# Patient Record
Sex: MALE | Race: WHITE | NOT HISPANIC OR LATINO | Employment: FULL TIME | URBAN - METROPOLITAN AREA
[De-identification: names, ages, dates, MRNs, and addresses within clinical notes are randomized per-mention and may not be internally consistent; named-entity substitution may affect disease eponyms.]

---

## 2017-11-29 ENCOUNTER — OFFICE VISIT - HEALTHEAST (OUTPATIENT)
Dept: FAMILY MEDICINE | Facility: CLINIC | Age: 37
End: 2017-11-29

## 2017-11-29 DIAGNOSIS — Z00.00 ROUTINE PHYSICAL EXAMINATION: ICD-10-CM

## 2017-11-29 DIAGNOSIS — E11.9 DIET-CONTROLLED DIABETES MELLITUS (H): ICD-10-CM

## 2017-11-29 LAB
CHOLEST SERPL-MCNC: 304 MG/DL
FASTING STATUS PATIENT QL REPORTED: YES
HBA1C MFR BLD: 13.1 % (ref 3.5–6)
HDLC SERPL-MCNC: 35 MG/DL
LDLC SERPL CALC-MCNC: ABNORMAL MG/DL
TRIGL SERPL-MCNC: 554 MG/DL

## 2017-11-29 ASSESSMENT — MIFFLIN-ST. JEOR: SCORE: 2133.26

## 2017-11-30 ENCOUNTER — COMMUNICATION - HEALTHEAST (OUTPATIENT)
Dept: INTERNAL MEDICINE | Facility: CLINIC | Age: 37
End: 2017-11-30

## 2017-11-30 DIAGNOSIS — E11.9 TYPE 2 DIABETES MELLITUS WITHOUT COMPLICATION, WITHOUT LONG-TERM CURRENT USE OF INSULIN (H): ICD-10-CM

## 2017-12-06 ENCOUNTER — OFFICE VISIT - HEALTHEAST (OUTPATIENT)
Dept: FAMILY MEDICINE | Facility: CLINIC | Age: 37
End: 2017-12-06

## 2017-12-06 DIAGNOSIS — E11.9 TYPE 2 DIABETES MELLITUS WITHOUT COMPLICATION, WITHOUT LONG-TERM CURRENT USE OF INSULIN (H): ICD-10-CM

## 2017-12-06 DIAGNOSIS — E78.5 HYPERLIPIDEMIA: ICD-10-CM

## 2017-12-06 LAB
CHOLEST SERPL-MCNC: 253 MG/DL
FASTING STATUS PATIENT QL REPORTED: YES
HDLC SERPL-MCNC: 36 MG/DL
LDLC SERPL CALC-MCNC: 167 MG/DL
TRIGL SERPL-MCNC: 249 MG/DL

## 2017-12-11 ENCOUNTER — COMMUNICATION - HEALTHEAST (OUTPATIENT)
Dept: INTERNAL MEDICINE | Facility: CLINIC | Age: 37
End: 2017-12-11

## 2018-01-30 ENCOUNTER — COMMUNICATION - HEALTHEAST (OUTPATIENT)
Dept: FAMILY MEDICINE | Facility: CLINIC | Age: 38
End: 2018-01-30

## 2018-01-30 DIAGNOSIS — E11.9 TYPE 2 DIABETES MELLITUS WITHOUT COMPLICATION, WITHOUT LONG-TERM CURRENT USE OF INSULIN (H): ICD-10-CM

## 2018-02-01 ENCOUNTER — COMMUNICATION - HEALTHEAST (OUTPATIENT)
Dept: FAMILY MEDICINE | Facility: CLINIC | Age: 38
End: 2018-02-01

## 2018-02-01 DIAGNOSIS — E11.9 TYPE 2 DIABETES MELLITUS WITHOUT COMPLICATION, WITHOUT LONG-TERM CURRENT USE OF INSULIN (H): ICD-10-CM

## 2018-02-26 ENCOUNTER — OFFICE VISIT - HEALTHEAST (OUTPATIENT)
Dept: FAMILY MEDICINE | Facility: CLINIC | Age: 38
End: 2018-02-26

## 2018-02-26 DIAGNOSIS — N47.1 PHIMOSIS: ICD-10-CM

## 2018-02-26 DIAGNOSIS — Z01.818 ENCOUNTER FOR PREOPERATIVE EXAMINATION FOR GENERAL SURGICAL PROCEDURE: ICD-10-CM

## 2018-02-26 DIAGNOSIS — E11.9 TYPE 2 DIABETES MELLITUS WITHOUT COMPLICATION, WITHOUT LONG-TERM CURRENT USE OF INSULIN (H): ICD-10-CM

## 2018-02-26 LAB
ANION GAP SERPL CALCULATED.3IONS-SCNC: 10 MMOL/L (ref 5–18)
BASOPHILS # BLD AUTO: 0.1 THOU/UL (ref 0–0.2)
BASOPHILS NFR BLD AUTO: 2 % (ref 0–2)
BUN SERPL-MCNC: 15 MG/DL (ref 8–22)
CALCIUM SERPL-MCNC: 10.2 MG/DL (ref 8.5–10.5)
CHLORIDE BLD-SCNC: 100 MMOL/L (ref 98–107)
CO2 SERPL-SCNC: 26 MMOL/L (ref 22–31)
CREAT SERPL-MCNC: 0.8 MG/DL (ref 0.7–1.3)
EOSINOPHIL # BLD AUTO: 0.1 THOU/UL (ref 0–0.4)
EOSINOPHIL NFR BLD AUTO: 1 % (ref 0–6)
ERYTHROCYTE [DISTWIDTH] IN BLOOD BY AUTOMATED COUNT: 12.1 % (ref 11–14.5)
GFR SERPL CREATININE-BSD FRML MDRD: >60 ML/MIN/1.73M2
GLUCOSE BLD-MCNC: 125 MG/DL (ref 70–125)
HBA1C MFR BLD: 8 % (ref 3.5–6)
HCT VFR BLD AUTO: 43.6 % (ref 40–54)
HGB BLD-MCNC: 15 G/DL (ref 14–18)
LYMPHOCYTES # BLD AUTO: 2.5 THOU/UL (ref 0.8–4.4)
LYMPHOCYTES NFR BLD AUTO: 35 % (ref 20–40)
MCH RBC QN AUTO: 28.4 PG (ref 27–34)
MCHC RBC AUTO-ENTMCNC: 34.4 G/DL (ref 32–36)
MCV RBC AUTO: 82 FL (ref 80–100)
MONOCYTES # BLD AUTO: 0.4 THOU/UL (ref 0–0.9)
MONOCYTES NFR BLD AUTO: 6 % (ref 2–10)
NEUTROPHILS # BLD AUTO: 4.1 THOU/UL (ref 2–7.7)
NEUTROPHILS NFR BLD AUTO: 57 % (ref 50–70)
PLATELET # BLD AUTO: 183 THOU/UL (ref 140–440)
PMV BLD AUTO: 8.6 FL (ref 7–10)
POTASSIUM BLD-SCNC: 4 MMOL/L (ref 3.5–5)
RBC # BLD AUTO: 5.29 MILL/UL (ref 4.4–6.2)
SODIUM SERPL-SCNC: 136 MMOL/L (ref 136–145)
WBC: 7.3 THOU/UL (ref 4–11)

## 2018-02-26 ASSESSMENT — MIFFLIN-ST. JEOR: SCORE: 2124.41

## 2018-02-27 ENCOUNTER — COMMUNICATION - HEALTHEAST (OUTPATIENT)
Dept: INTERNAL MEDICINE | Facility: CLINIC | Age: 38
End: 2018-02-27

## 2018-03-01 ENCOUNTER — OFFICE VISIT - HEALTHEAST (OUTPATIENT)
Dept: FAMILY MEDICINE | Facility: CLINIC | Age: 38
End: 2018-03-01

## 2018-03-01 DIAGNOSIS — M54.50 ACUTE BILATERAL LOW BACK PAIN WITHOUT SCIATICA: ICD-10-CM

## 2018-03-01 DIAGNOSIS — S39.012A LUMBAR STRAIN, INITIAL ENCOUNTER: ICD-10-CM

## 2018-03-01 ASSESSMENT — MIFFLIN-ST. JEOR: SCORE: 2138.02

## 2018-03-02 ENCOUNTER — OFFICE VISIT - HEALTHEAST (OUTPATIENT)
Dept: EDUCATION SERVICES | Facility: CLINIC | Age: 38
End: 2018-03-02

## 2018-03-02 DIAGNOSIS — E11.9 TYPE 2 DIABETES MELLITUS WITHOUT COMPLICATION, WITHOUT LONG-TERM CURRENT USE OF INSULIN (H): ICD-10-CM

## 2018-03-06 ENCOUNTER — COMMUNICATION - HEALTHEAST (OUTPATIENT)
Dept: FAMILY MEDICINE | Facility: CLINIC | Age: 38
End: 2018-03-06

## 2018-03-06 ENCOUNTER — COMMUNICATION - HEALTHEAST (OUTPATIENT)
Dept: SCHEDULING | Facility: CLINIC | Age: 38
End: 2018-03-06

## 2018-04-04 ENCOUNTER — RECORDS - HEALTHEAST (OUTPATIENT)
Dept: ADMINISTRATIVE | Facility: OTHER | Age: 38
End: 2018-04-04

## 2018-05-31 ENCOUNTER — COMMUNICATION - HEALTHEAST (OUTPATIENT)
Dept: FAMILY MEDICINE | Facility: CLINIC | Age: 38
End: 2018-05-31

## 2018-05-31 DIAGNOSIS — E11.9 TYPE 2 DIABETES MELLITUS WITHOUT COMPLICATION, WITHOUT LONG-TERM CURRENT USE OF INSULIN (H): ICD-10-CM

## 2019-02-24 ENCOUNTER — COMMUNICATION - HEALTHEAST (OUTPATIENT)
Dept: FAMILY MEDICINE | Facility: CLINIC | Age: 39
End: 2019-02-24

## 2019-02-24 DIAGNOSIS — E11.9 TYPE 2 DIABETES MELLITUS WITHOUT COMPLICATION, WITHOUT LONG-TERM CURRENT USE OF INSULIN (H): ICD-10-CM

## 2019-07-12 ENCOUNTER — OFFICE VISIT - HEALTHEAST (OUTPATIENT)
Dept: FAMILY MEDICINE | Facility: CLINIC | Age: 39
End: 2019-07-12

## 2019-07-12 DIAGNOSIS — L97.511 DIABETIC ULCER OF TOE OF RIGHT FOOT ASSOCIATED WITH TYPE 2 DIABETES MELLITUS, LIMITED TO BREAKDOWN OF SKIN (H): ICD-10-CM

## 2019-07-12 DIAGNOSIS — E11.621 DIABETIC ULCER OF TOE OF RIGHT FOOT ASSOCIATED WITH TYPE 2 DIABETES MELLITUS, LIMITED TO BREAKDOWN OF SKIN (H): ICD-10-CM

## 2019-07-15 ENCOUNTER — OFFICE VISIT - HEALTHEAST (OUTPATIENT)
Dept: FAMILY MEDICINE | Facility: CLINIC | Age: 39
End: 2019-07-15

## 2019-07-15 DIAGNOSIS — E11.41 DIABETIC MONONEUROPATHY ASSOCIATED WITH TYPE 2 DIABETES MELLITUS (H): ICD-10-CM

## 2019-07-15 DIAGNOSIS — L97.501 DIABETIC ULCER OF TOE ASSOCIATED WITH TYPE 2 DIABETES MELLITUS, LIMITED TO BREAKDOWN OF SKIN, UNSPECIFIED LATERALITY (H): ICD-10-CM

## 2019-07-15 DIAGNOSIS — E11.621 DIABETIC ULCER OF TOE ASSOCIATED WITH TYPE 2 DIABETES MELLITUS, LIMITED TO BREAKDOWN OF SKIN, UNSPECIFIED LATERALITY (H): ICD-10-CM

## 2019-07-15 DIAGNOSIS — E11.9 TYPE 2 DIABETES MELLITUS WITHOUT COMPLICATION, WITHOUT LONG-TERM CURRENT USE OF INSULIN (H): ICD-10-CM

## 2019-07-15 DIAGNOSIS — E78.5 HYPERLIPIDEMIA, UNSPECIFIED HYPERLIPIDEMIA TYPE: ICD-10-CM

## 2019-07-15 LAB
ALBUMIN SERPL-MCNC: 4.3 G/DL (ref 3.5–5)
ALP SERPL-CCNC: 98 U/L (ref 45–120)
ALT SERPL W P-5'-P-CCNC: 87 U/L (ref 0–45)
ANION GAP SERPL CALCULATED.3IONS-SCNC: 9 MMOL/L (ref 5–18)
AST SERPL W P-5'-P-CCNC: 50 U/L (ref 0–40)
BILIRUB SERPL-MCNC: 0.6 MG/DL (ref 0–1)
BUN SERPL-MCNC: 15 MG/DL (ref 8–22)
CALCIUM SERPL-MCNC: 10.1 MG/DL (ref 8.5–10.5)
CHLORIDE BLD-SCNC: 103 MMOL/L (ref 98–107)
CHOLEST SERPL-MCNC: 243 MG/DL
CO2 SERPL-SCNC: 26 MMOL/L (ref 22–31)
CREAT SERPL-MCNC: 1.05 MG/DL (ref 0.7–1.3)
CREAT UR-MCNC: 66.2 MG/DL
FASTING STATUS PATIENT QL REPORTED: NO
GFR SERPL CREATININE-BSD FRML MDRD: >60 ML/MIN/1.73M2
GLUCOSE BLD-MCNC: 169 MG/DL (ref 70–125)
HBA1C MFR BLD: 11.6 % (ref 3.5–6)
HDLC SERPL-MCNC: 40 MG/DL
LDLC SERPL CALC-MCNC: 129 MG/DL
MICROALBUMIN UR-MCNC: 0.94 MG/DL (ref 0–1.99)
MICROALBUMIN/CREAT UR: 14.2 MG/G
POTASSIUM BLD-SCNC: 4.3 MMOL/L (ref 3.5–5)
PROT SERPL-MCNC: 8 G/DL (ref 6–8)
SODIUM SERPL-SCNC: 138 MMOL/L (ref 136–145)
TRIGL SERPL-MCNC: 368 MG/DL

## 2019-07-17 ENCOUNTER — OFFICE VISIT - HEALTHEAST (OUTPATIENT)
Dept: FAMILY MEDICINE | Facility: CLINIC | Age: 39
End: 2019-07-17

## 2019-07-17 ENCOUNTER — COMMUNICATION - HEALTHEAST (OUTPATIENT)
Dept: SCHEDULING | Facility: CLINIC | Age: 39
End: 2019-07-17

## 2019-07-17 DIAGNOSIS — E66.9 DIABETES MELLITUS TYPE 2 IN OBESE: ICD-10-CM

## 2019-07-17 DIAGNOSIS — E11.69 DIABETES MELLITUS TYPE 2 IN OBESE: ICD-10-CM

## 2019-07-17 DIAGNOSIS — K08.89 PAIN, DENTAL: ICD-10-CM

## 2019-07-17 DIAGNOSIS — L97.421 DIABETIC ULCER OF LEFT MIDFOOT ASSOCIATED WITH DIABETES MELLITUS DUE TO UNDERLYING CONDITION, LIMITED TO BREAKDOWN OF SKIN (H): ICD-10-CM

## 2019-07-17 DIAGNOSIS — E08.621 DIABETIC ULCER OF LEFT MIDFOOT ASSOCIATED WITH DIABETES MELLITUS DUE TO UNDERLYING CONDITION, LIMITED TO BREAKDOWN OF SKIN (H): ICD-10-CM

## 2019-07-17 ASSESSMENT — MIFFLIN-ST. JEOR: SCORE: 2120.16

## 2019-07-18 ENCOUNTER — COMMUNICATION - HEALTHEAST (OUTPATIENT)
Dept: FAMILY MEDICINE | Facility: CLINIC | Age: 39
End: 2019-07-18

## 2019-07-22 ENCOUNTER — COMMUNICATION - HEALTHEAST (OUTPATIENT)
Dept: FAMILY MEDICINE | Facility: CLINIC | Age: 39
End: 2019-07-22

## 2019-07-24 ENCOUNTER — OFFICE VISIT - HEALTHEAST (OUTPATIENT)
Dept: FAMILY MEDICINE | Facility: CLINIC | Age: 39
End: 2019-07-24

## 2019-07-24 DIAGNOSIS — E11.9 TYPE 2 DIABETES MELLITUS WITHOUT COMPLICATION, WITHOUT LONG-TERM CURRENT USE OF INSULIN (H): ICD-10-CM

## 2019-08-22 ENCOUNTER — RECORDS - HEALTHEAST (OUTPATIENT)
Dept: ADMINISTRATIVE | Facility: OTHER | Age: 39
End: 2019-08-22

## 2019-08-28 ENCOUNTER — COMMUNICATION - HEALTHEAST (OUTPATIENT)
Dept: FAMILY MEDICINE | Facility: CLINIC | Age: 39
End: 2019-08-28

## 2019-08-28 ENCOUNTER — OFFICE VISIT - HEALTHEAST (OUTPATIENT)
Dept: FAMILY MEDICINE | Facility: CLINIC | Age: 39
End: 2019-08-28

## 2019-08-28 DIAGNOSIS — L97.501 DIABETIC ULCER OF TOE ASSOCIATED WITH TYPE 2 DIABETES MELLITUS, LIMITED TO BREAKDOWN OF SKIN, UNSPECIFIED LATERALITY (H): ICD-10-CM

## 2019-08-28 DIAGNOSIS — E11.621 DIABETIC ULCER OF TOE ASSOCIATED WITH TYPE 2 DIABETES MELLITUS, LIMITED TO BREAKDOWN OF SKIN, UNSPECIFIED LATERALITY (H): ICD-10-CM

## 2019-08-28 DIAGNOSIS — E11.9 TYPE 2 DIABETES MELLITUS WITHOUT COMPLICATION, WITHOUT LONG-TERM CURRENT USE OF INSULIN (H): ICD-10-CM

## 2019-08-28 LAB — HBA1C MFR BLD: 7.6 % (ref 3.5–6)

## 2020-01-03 ENCOUNTER — COMMUNICATION - HEALTHEAST (OUTPATIENT)
Dept: INTERNAL MEDICINE | Facility: CLINIC | Age: 40
End: 2020-01-03

## 2020-01-03 ENCOUNTER — RECORDS - HEALTHEAST (OUTPATIENT)
Dept: ADMINISTRATIVE | Facility: OTHER | Age: 40
End: 2020-01-03

## 2020-01-03 DIAGNOSIS — E11.9 TYPE 2 DIABETES MELLITUS WITHOUT COMPLICATION, WITHOUT LONG-TERM CURRENT USE OF INSULIN (H): ICD-10-CM

## 2020-01-10 ENCOUNTER — COMMUNICATION - HEALTHEAST (OUTPATIENT)
Dept: FAMILY MEDICINE | Facility: CLINIC | Age: 40
End: 2020-01-10

## 2020-01-10 ENCOUNTER — OFFICE VISIT - HEALTHEAST (OUTPATIENT)
Dept: FAMILY MEDICINE | Facility: CLINIC | Age: 40
End: 2020-01-10

## 2020-01-10 DIAGNOSIS — Z00.00 ROUTINE GENERAL MEDICAL EXAMINATION AT A HEALTH CARE FACILITY: ICD-10-CM

## 2020-01-10 DIAGNOSIS — E11.3299 TYPE 2 DIABETES MELLITUS WITH MILD NONPROLIFERATIVE RETINOPATHY WITHOUT MACULAR EDEMA, WITHOUT LONG-TERM CURRENT USE OF INSULIN, UNSPECIFIED LATERALITY (H): ICD-10-CM

## 2020-01-10 DIAGNOSIS — R05.9 COUGH: ICD-10-CM

## 2020-01-10 DIAGNOSIS — E78.5 HYPERLIPIDEMIA, UNSPECIFIED HYPERLIPIDEMIA TYPE: ICD-10-CM

## 2020-01-10 LAB
ALBUMIN SERPL-MCNC: 4.4 G/DL (ref 3.5–5)
ALP SERPL-CCNC: 83 U/L (ref 45–120)
ALT SERPL W P-5'-P-CCNC: 53 U/L (ref 0–45)
ANION GAP SERPL CALCULATED.3IONS-SCNC: 11 MMOL/L (ref 5–18)
AST SERPL W P-5'-P-CCNC: 25 U/L (ref 0–40)
BILIRUB SERPL-MCNC: 0.8 MG/DL (ref 0–1)
BUN SERPL-MCNC: 16 MG/DL (ref 8–22)
CALCIUM SERPL-MCNC: 10.4 MG/DL (ref 8.5–10.5)
CHLORIDE BLD-SCNC: 103 MMOL/L (ref 98–107)
CHOLEST SERPL-MCNC: 201 MG/DL
CO2 SERPL-SCNC: 25 MMOL/L (ref 22–31)
CREAT SERPL-MCNC: 0.93 MG/DL (ref 0.7–1.3)
CREAT UR-MCNC: 176.4 MG/DL
FASTING STATUS PATIENT QL REPORTED: YES
GFR SERPL CREATININE-BSD FRML MDRD: >60 ML/MIN/1.73M2
GLUCOSE BLD-MCNC: 124 MG/DL (ref 70–125)
HBA1C MFR BLD: 6.6 % (ref 3.5–6)
HDLC SERPL-MCNC: 35 MG/DL
LDLC SERPL CALC-MCNC: 125 MG/DL
MICROALBUMIN UR-MCNC: 0.84 MG/DL (ref 0–1.99)
MICROALBUMIN/CREAT UR: 4.8 MG/G
POTASSIUM BLD-SCNC: 4.5 MMOL/L (ref 3.5–5)
PROT SERPL-MCNC: 8.2 G/DL (ref 6–8)
SODIUM SERPL-SCNC: 139 MMOL/L (ref 136–145)
TRIGL SERPL-MCNC: 204 MG/DL

## 2020-01-10 ASSESSMENT — MIFFLIN-ST. JEOR: SCORE: 2064.88

## 2020-01-13 ENCOUNTER — COMMUNICATION - HEALTHEAST (OUTPATIENT)
Dept: FAMILY MEDICINE | Facility: CLINIC | Age: 40
End: 2020-01-13

## 2020-03-27 ENCOUNTER — COMMUNICATION - HEALTHEAST (OUTPATIENT)
Dept: FAMILY MEDICINE | Facility: CLINIC | Age: 40
End: 2020-03-27

## 2020-03-27 DIAGNOSIS — E11.3299 TYPE 2 DIABETES MELLITUS WITH MILD NONPROLIFERATIVE RETINOPATHY WITHOUT MACULAR EDEMA, WITHOUT LONG-TERM CURRENT USE OF INSULIN, UNSPECIFIED LATERALITY (H): ICD-10-CM

## 2020-04-15 ENCOUNTER — COMMUNICATION - HEALTHEAST (OUTPATIENT)
Dept: FAMILY MEDICINE | Facility: CLINIC | Age: 40
End: 2020-04-15

## 2020-04-16 ENCOUNTER — OFFICE VISIT - HEALTHEAST (OUTPATIENT)
Dept: FAMILY MEDICINE | Facility: CLINIC | Age: 40
End: 2020-04-16

## 2020-04-16 DIAGNOSIS — N50.819 TESTICULAR PAIN: ICD-10-CM

## 2020-04-17 ENCOUNTER — COMMUNICATION - HEALTHEAST (OUTPATIENT)
Dept: FAMILY MEDICINE | Facility: CLINIC | Age: 40
End: 2020-04-17

## 2020-04-17 ENCOUNTER — OFFICE VISIT - HEALTHEAST (OUTPATIENT)
Dept: FAMILY MEDICINE | Facility: CLINIC | Age: 40
End: 2020-04-17

## 2020-04-17 DIAGNOSIS — R35.0 URINARY FREQUENCY: ICD-10-CM

## 2020-04-17 DIAGNOSIS — N50.819 TESTICULAR PAIN: ICD-10-CM

## 2020-04-17 LAB
ALBUMIN UR-MCNC: NEGATIVE MG/DL
APPEARANCE UR: CLEAR
BACTERIA #/AREA URNS HPF: NORMAL HPF
BILIRUB UR QL STRIP: NEGATIVE
COLOR UR AUTO: YELLOW
GLUCOSE UR STRIP-MCNC: NEGATIVE MG/DL
HGB UR QL STRIP: NEGATIVE
KETONES UR STRIP-MCNC: NEGATIVE MG/DL
LEUKOCYTE ESTERASE UR QL STRIP: NEGATIVE
NITRATE UR QL: NEGATIVE
PH UR STRIP: 5.5 [PH] (ref 5–8)
PSA SERPL-MCNC: 0.6 NG/ML (ref 0–2.5)
RBC #/AREA URNS AUTO: NORMAL HPF
SP GR UR STRIP: >=1.03 (ref 1–1.03)
SQUAMOUS #/AREA URNS AUTO: NORMAL LPF
UROBILINOGEN UR STRIP-ACNC: NORMAL
WBC #/AREA URNS AUTO: NORMAL HPF

## 2020-04-17 ASSESSMENT — MIFFLIN-ST. JEOR: SCORE: 2073.95

## 2020-04-29 ENCOUNTER — COMMUNICATION - HEALTHEAST (OUTPATIENT)
Dept: FAMILY MEDICINE | Facility: CLINIC | Age: 40
End: 2020-04-29

## 2020-05-04 ENCOUNTER — OFFICE VISIT - HEALTHEAST (OUTPATIENT)
Dept: FAMILY MEDICINE | Facility: CLINIC | Age: 40
End: 2020-05-04

## 2020-05-04 DIAGNOSIS — E11.3299 TYPE 2 DIABETES MELLITUS WITH MILD NONPROLIFERATIVE RETINOPATHY WITHOUT MACULAR EDEMA, WITHOUT LONG-TERM CURRENT USE OF INSULIN, UNSPECIFIED LATERALITY (H): ICD-10-CM

## 2020-05-04 DIAGNOSIS — R10.30 LOWER ABDOMINAL PAIN: ICD-10-CM

## 2020-05-04 ASSESSMENT — MIFFLIN-ST. JEOR: SCORE: 2077.58

## 2020-07-03 ENCOUNTER — COMMUNICATION - HEALTHEAST (OUTPATIENT)
Dept: FAMILY MEDICINE | Facility: CLINIC | Age: 40
End: 2020-07-03

## 2020-07-03 DIAGNOSIS — E11.3299 TYPE 2 DIABETES MELLITUS WITH MILD NONPROLIFERATIVE RETINOPATHY WITHOUT MACULAR EDEMA, WITHOUT LONG-TERM CURRENT USE OF INSULIN, UNSPECIFIED LATERALITY (H): ICD-10-CM

## 2020-07-09 ENCOUNTER — AMBULATORY - HEALTHEAST (OUTPATIENT)
Dept: LAB | Facility: CLINIC | Age: 40
End: 2020-07-09

## 2020-07-09 ENCOUNTER — COMMUNICATION - HEALTHEAST (OUTPATIENT)
Dept: FAMILY MEDICINE | Facility: CLINIC | Age: 40
End: 2020-07-09

## 2020-07-09 DIAGNOSIS — E11.3299 TYPE 2 DIABETES MELLITUS WITH MILD NONPROLIFERATIVE RETINOPATHY WITHOUT MACULAR EDEMA, WITHOUT LONG-TERM CURRENT USE OF INSULIN, UNSPECIFIED LATERALITY (H): ICD-10-CM

## 2020-07-09 LAB — HBA1C MFR BLD: 5.8 % (ref 3.5–6)

## 2020-07-10 ENCOUNTER — COMMUNICATION - HEALTHEAST (OUTPATIENT)
Dept: FAMILY MEDICINE | Facility: CLINIC | Age: 40
End: 2020-07-10

## 2020-08-13 ENCOUNTER — COMMUNICATION - HEALTHEAST (OUTPATIENT)
Dept: FAMILY MEDICINE | Facility: CLINIC | Age: 40
End: 2020-08-13

## 2020-08-13 DIAGNOSIS — E11.3299 TYPE 2 DIABETES MELLITUS WITH MILD NONPROLIFERATIVE RETINOPATHY WITHOUT MACULAR EDEMA, WITHOUT LONG-TERM CURRENT USE OF INSULIN, UNSPECIFIED LATERALITY (H): ICD-10-CM

## 2020-08-27 ENCOUNTER — COMMUNICATION - HEALTHEAST (OUTPATIENT)
Dept: FAMILY MEDICINE | Facility: CLINIC | Age: 40
End: 2020-08-27

## 2020-08-27 DIAGNOSIS — E11.3299 TYPE 2 DIABETES MELLITUS WITH MILD NONPROLIFERATIVE RETINOPATHY WITHOUT MACULAR EDEMA, WITHOUT LONG-TERM CURRENT USE OF INSULIN, UNSPECIFIED LATERALITY (H): ICD-10-CM

## 2020-09-02 ENCOUNTER — OFFICE VISIT - HEALTHEAST (OUTPATIENT)
Dept: FAMILY MEDICINE | Facility: CLINIC | Age: 40
End: 2020-09-02

## 2020-09-02 DIAGNOSIS — F41.9 ANXIETY: ICD-10-CM

## 2020-09-02 DIAGNOSIS — E11.3299 TYPE 2 DIABETES MELLITUS WITH MILD NONPROLIFERATIVE RETINOPATHY WITHOUT MACULAR EDEMA, WITHOUT LONG-TERM CURRENT USE OF INSULIN, UNSPECIFIED LATERALITY (H): ICD-10-CM

## 2020-09-02 DIAGNOSIS — R10.10 UPPER ABDOMINAL PAIN: ICD-10-CM

## 2020-09-02 LAB
ALBUMIN SERPL-MCNC: 4.7 G/DL (ref 3.5–5)
ALP SERPL-CCNC: 69 U/L (ref 45–120)
ALT SERPL W P-5'-P-CCNC: 46 U/L (ref 0–45)
ANION GAP SERPL CALCULATED.3IONS-SCNC: 12 MMOL/L (ref 5–18)
AST SERPL W P-5'-P-CCNC: 27 U/L (ref 0–40)
BILIRUB SERPL-MCNC: 1.2 MG/DL (ref 0–1)
BUN SERPL-MCNC: 13 MG/DL (ref 8–22)
CALCIUM SERPL-MCNC: 10.2 MG/DL (ref 8.5–10.5)
CHLORIDE BLD-SCNC: 99 MMOL/L (ref 98–107)
CO2 SERPL-SCNC: 27 MMOL/L (ref 22–31)
CREAT SERPL-MCNC: 1 MG/DL (ref 0.7–1.3)
GFR SERPL CREATININE-BSD FRML MDRD: >60 ML/MIN/1.73M2
GLUCOSE BLD-MCNC: 104 MG/DL (ref 70–125)
LIPASE SERPL-CCNC: 21 U/L (ref 0–52)
POTASSIUM BLD-SCNC: 3.9 MMOL/L (ref 3.5–5)
PROT SERPL-MCNC: 8.7 G/DL (ref 6–8)
SODIUM SERPL-SCNC: 138 MMOL/L (ref 136–145)

## 2020-09-03 ENCOUNTER — COMMUNICATION - HEALTHEAST (OUTPATIENT)
Dept: FAMILY MEDICINE | Facility: CLINIC | Age: 40
End: 2020-09-03

## 2020-09-08 ENCOUNTER — RECORDS - HEALTHEAST (OUTPATIENT)
Dept: ADMINISTRATIVE | Facility: OTHER | Age: 40
End: 2020-09-08

## 2020-09-08 LAB — RETINOPATHY: POSITIVE

## 2020-09-14 ENCOUNTER — RECORDS - HEALTHEAST (OUTPATIENT)
Dept: HEALTH INFORMATION MANAGEMENT | Facility: CLINIC | Age: 40
End: 2020-09-14

## 2020-10-11 ENCOUNTER — COMMUNICATION - HEALTHEAST (OUTPATIENT)
Dept: FAMILY MEDICINE | Facility: CLINIC | Age: 40
End: 2020-10-11

## 2020-10-11 DIAGNOSIS — E11.3299 TYPE 2 DIABETES MELLITUS WITH MILD NONPROLIFERATIVE RETINOPATHY WITHOUT MACULAR EDEMA, WITHOUT LONG-TERM CURRENT USE OF INSULIN, UNSPECIFIED LATERALITY (H): ICD-10-CM

## 2020-11-09 ENCOUNTER — COMMUNICATION - HEALTHEAST (OUTPATIENT)
Dept: FAMILY MEDICINE | Facility: CLINIC | Age: 40
End: 2020-11-09

## 2021-01-15 ENCOUNTER — COMMUNICATION - HEALTHEAST (OUTPATIENT)
Dept: FAMILY MEDICINE | Facility: CLINIC | Age: 41
End: 2021-01-15

## 2021-02-02 ENCOUNTER — COMMUNICATION - HEALTHEAST (OUTPATIENT)
Dept: FAMILY MEDICINE | Facility: CLINIC | Age: 41
End: 2021-02-02

## 2021-02-02 DIAGNOSIS — E11.3299 TYPE 2 DIABETES MELLITUS WITH MILD NONPROLIFERATIVE RETINOPATHY WITHOUT MACULAR EDEMA, WITHOUT LONG-TERM CURRENT USE OF INSULIN, UNSPECIFIED LATERALITY (H): ICD-10-CM

## 2021-02-17 ENCOUNTER — OFFICE VISIT - HEALTHEAST (OUTPATIENT)
Dept: FAMILY MEDICINE | Facility: CLINIC | Age: 41
End: 2021-02-17

## 2021-02-17 DIAGNOSIS — E11.41 DIABETIC MONONEUROPATHY ASSOCIATED WITH TYPE 2 DIABETES MELLITUS (H): ICD-10-CM

## 2021-02-17 DIAGNOSIS — E11.3299 TYPE 2 DIABETES MELLITUS WITH MILD NONPROLIFERATIVE RETINOPATHY WITHOUT MACULAR EDEMA, WITHOUT LONG-TERM CURRENT USE OF INSULIN, UNSPECIFIED LATERALITY (H): ICD-10-CM

## 2021-02-17 LAB
CHOLEST SERPL-MCNC: 225 MG/DL
CREAT UR-MCNC: 72.9 MG/DL
FASTING STATUS PATIENT QL REPORTED: NO
HBA1C MFR BLD: 6.4 %
HDLC SERPL-MCNC: 42 MG/DL
LDLC SERPL CALC-MCNC: 126 MG/DL
MICROALBUMIN UR-MCNC: <0.5 MG/DL (ref 0–1.99)
MICROALBUMIN/CREAT UR: NORMAL MG/G{CREAT}
TRIGL SERPL-MCNC: 284 MG/DL

## 2021-02-22 ENCOUNTER — COMMUNICATION - HEALTHEAST (OUTPATIENT)
Dept: FAMILY MEDICINE | Facility: CLINIC | Age: 41
End: 2021-02-22

## 2021-04-05 ENCOUNTER — AMBULATORY - HEALTHEAST (OUTPATIENT)
Dept: NURSING | Facility: CLINIC | Age: 41
End: 2021-04-05

## 2021-04-26 ENCOUNTER — AMBULATORY - HEALTHEAST (OUTPATIENT)
Dept: NURSING | Facility: CLINIC | Age: 41
End: 2021-04-26

## 2021-05-30 NOTE — PROGRESS NOTES
Assessment/Plan:        1. Diabetic mononeuropathy associated with type 2 diabetes mellitus (H)    - Ambulatory referral to Diabetes Education Program(New Diagnosis)      - Glycosylated Hemoglobin A1c  - Microalbumin, Random Urine    - Comprehensive Metabolic Panel  Mildly elevated liver enzymes and elevated blood sugar  Management is aimed at better diabetic control cutting back on carbs and fats  Continue with metformin XR 1000 mg daily  Goal to keep BG level below 150    Follow up in 2 weeks.     - blood glucose test strips; Use 1 each As Directed daily as needed. Dispense brand per patient's insurance at pharmacy discretion.  Test two times a day  Dispense: 200 strip; Refill: 3    3. Diabetic ulcer of toe associated with type 2 diabetes mellitus, limited to breakdown of skin, unspecified laterality (H)  Continue current management with the antibiotic and wound management  Follow-up in 2 weeks    4. Hyperlipidemia, unspecified hyperlipidemia type    - Lipid Profile  Elevated triglycerides and cholesterol  Make an attempt to improve diet, cut back on the carbs and fats,  and exercise more efficiently to aid in medical management of this problem.    Recheck in 3 months       At the conclusion of the encounter the plan of care, disposition and all questions were answered and reviewed, and the patient acknowledged understanding and was involved in the decision making regarding the overall care plan.     45 minutes or greater were spent with the patient today with greater than 50% of the times spent in counseling and management of care.        Subjective:    Patient ID:   Adam Arce is a 39 y.o. male comes in follow-up of diabetes management  He says to have been neglected this issue for some time causing his health to deteriorate with noting to be getting sores/ulcers on the bottom of his feet near the toes and was recently seen for this and put on antibiotics; and now wants to get back on to getting his health  back in shape    He is on metformin XR 1000 mg daily  Average BG: Has previously been in the 300 's.  But recently coming down to higher 100's.       Lab Results   Component Value Date    HGBA1C 8.0 (H) 02/26/2018      Lab Results   Component Value Date    LDLCALC 167 (H) 12/06/2017      Lab Results   Component Value Date    MICROALBUR 0.78 12/06/2017     He has no other additional concerns         Review of Systems  Allergy: reviewed  General : negative  A complete 7 point review of systems was obtained and is negative other than what is stated in the HPI.         The following patient's history were reviewed and updated as appropriate:   He  has a past medical history of Diet-controlled diabetes mellitus (H) and Overweight..      Outpatient Encounter Medications as of 7/15/2019   Medication Sig Dispense Refill     acetaminophen (TYLENOL) 500 MG tablet Take 500 mg by mouth every 6 (six) hours as needed for pain.       blood glucose meter (GLUCOMETER) Use 1 each As Directed as needed. Dispense glucometer brand per patient's insurance at pharmacy discretion. 1 each 0     blood glucose test strips Use 1 each As Directed daily as needed. Dispense brand per patient's insurance at pharmacy discretion. 100 strip 3     generic lancets (FINGERSTIX LANCETS) Use 1 each As Directed daily as needed. Dispense brand per patient's insurance at pharmacy discretion. 100 each 3     ibuprofen (ADVIL,MOTRIN) 200 MG tablet Take 200 mg by mouth every 6 (six) hours as needed for pain.       metFORMIN (GLUCOPHAGE-XR) 500 MG 24 hr tablet TAKE 2 TABLETS BY MOUTH DAILY WITH BREAKFAST. 60 tablet 0     sulfamethoxazole-trimethoprim (BACTRIM DS) 800-160 mg per tablet Take 1 tablet by mouth 2 (two) times a day for 10 days. 20 tablet 0     [DISCONTINUED] cyclobenzaprine (FLEXERIL) 5 MG tablet Take 1 tablet (5 mg total) by mouth 3 (three) times a day as needed for muscle spasms. 30 tablet 0     No facility-administered encounter medications on file  as of 7/15/2019.          Objective:   /80 (Patient Site: Right Arm, Patient Position: Sitting, Cuff Size: Adult Large)   Pulse 79   Wt (!) 253 lb 14.4 oz (115.2 kg)   SpO2 99%   BMI 33.04 kg/m        Physical Exam  General Appearance:    Alert, well hydrated, no distress,    Eyes:    PERRL, conjunctiva/corneas clear,    Throat:   Lips, mucosa, and tongue normal; teeth and gums normal   Neck:   Supple, symmetrical, trachea midline, no adenopathy;        thyroid:  No enlargement/tenderness/nodules; no carotid    bruit or JVD   Lungs:     Clear to auscultation bilaterally, respirations unlabored   Heart:    Regular rate and rhythm, S1 and S2 normal, no murmur, rub   or gallop   Abdomen:     Soft, non-tender, normal bowel sounds, no rebound or guarding, no masses, no organomegaly   Extremities:   Extremities normal, atraumatic, no cyanosis or edema   Skin:  There is a grade 1 plantar ulcerative lesion  proximal 2nd toe  Skin color, texture, turgor normal, no other rashes or lesions

## 2021-05-30 NOTE — PROGRESS NOTES
Assessment/Plan:         Adam was seen today for wound check.    Diagnoses and all orders for this visit:    Diabetic ulcer of toe of right foot associated with type 2 diabetes mellitus, limited to breakdown of skin (H): Type 1 diabetic ulcer bilaterally. The left side has early signs of becoming infected and so I did start bactrim two times a day [id number], we dressed the wound, discussed ways to protect his toes from pressure sores. He has a follow-up scheduled already on Monday and will have these rechecked. Currently does not appear he needs debridement. Discussed concerning symptoms for which to return over the weekend.   -     sulfamethoxazole-trimethoprim (BACTRIM DS) 800-160 mg per tablet; Take 1 tablet by mouth 2 (two) times a day for 10 days.                Plan of care was discussed with the patient and/or guardian. They verbalize understanding of the treatment options and plan of care.    Milena Mckeon       Subjective:        Adam Arce is a 39 y.o. male who presents for sores on his feet.   Bilaterally, he has little white sores on his feet. [facility] tells him these are ulcers from diabetes so she came in.   Have been present for 4-5 days. Keeping them clean and covered. Not getting worse but not getting better either. Maybe a little more swelling now.     He is diabetic, knows his blood sugars are very high. Taking 1000mg a day of metformin (XR) but has checked his sugars and they are quite high. He has an appointment with his PCP on Monday to get this checked.     He has no fever, chills, nausea, malaise. His toes do not hurt.          Objective:       Blood pressure (!) 148/95, pulse 80, temperature 97.9  F (36.6  C), temperature source Oral, resp. rate 16, weight (!) 257 lb 2 oz (116.6 kg), SpO2 98 %.   Gen: well appearing, no distress.  Skin: Right 2nd toe with a shallow ulcer at the base of the toe, it does not go completely through the skin, it is clean and the base of the ulcer is pink with  only a thin layer of yellow sloughing skin.   Left toe is nearly identical, but with erythema extended to the lateral side of the toe and increase swelling.   No pain on palpation.

## 2021-05-30 NOTE — PROGRESS NOTES
Assessment/Plan:        1. Type 2 diabetes mellitus without complication, without long-term current use of insulin (H)  Follow up on diabetes  Not optimized     Plan:   Start:   - dulaglutide (TRULICITY) 0.75 mg/0.5 mL PnIj; Inject 0.75 mg under the skin every 7 days.  Dispense: 4 Syringe; Refill: 0    Continue:     - metFORMIN (GLUCOPHAGE-XR) 500 MG 24 hr tablet; Take 2 tablets (1,000 mg total) by mouth daily with breakfast.  Dispense: 60 tablet; Refill: 0       Follow up in a month        At the conclusion of the encounter the plan of care, disposition and all questions were answered and reviewed, and the patient acknowledged understanding and was involved in the decision making regarding the overall care plan.           Subjective:    Patient ID:   Adam Arce is a 39 y.o. male comes in for follow up.    1. Type 2 diabetes mellitus   Managed on metformin XR 1000 mg daily  Doing better on his BG, stating to just barely making to the 150 goal marker.        No other side effects or concerns         Review of Systems  Allergy: reviewed  General : negative  A complete 5 point review of systems was obtained and is negative other than what is stated in the HPI.       The following patient's history were reviewed and updated as appropriate:   He  has a past medical history of Diet-controlled diabetes mellitus (H) and Overweight..      Outpatient Encounter Medications as of 7/24/2019   Medication Sig Dispense Refill     acetaminophen (TYLENOL) 500 MG tablet Take 500 mg by mouth every 6 (six) hours as needed for pain.       blood glucose meter (GLUCOMETER) Use 1 each As Directed as needed. Dispense glucometer brand per patient's insurance at pharmacy discretion. 1 each 0     blood glucose test strips Use 1 each As Directed daily as needed. Dispense brand per patient's insurance at pharmacy discretion.  Test two times a day 200 strip 3     generic lancets (FINGERSTIX LANCETS) Use 1 each As Directed daily as needed.  Dispense brand per patient's insurance at pharmacy discretion. 100 each 3     ibuprofen (ADVIL,MOTRIN) 200 MG tablet Take 200 mg by mouth every 6 (six) hours as needed for pain.       metFORMIN (GLUCOPHAGE-XR) 500 MG 24 hr tablet TAKE 2 TABLETS BY MOUTH DAILY WITH BREAKFAST. 60 tablet 0     No facility-administered encounter medications on file as of 7/24/2019.          Objective:   /64 (Patient Site: Right Arm, Patient Position: Sitting, Cuff Size: Adult Large)   Pulse (!) 59   Wt (!) 249 lb 11.2 oz (113.3 kg)   SpO2 98%   BMI 32.50 kg/m        Physical Exam  General Appearance:    Alert, well hydrated, no distress   Throat:   mucous membranes moist, pharynx normal without lesions   Neck:   Supple, symmetrical, trachea midline, no adenopathy;     thyroid:  no enlargement/tenderness/nodules;    Lungs:     clear to auscultation, no wheezes, rales or rhonchi, symmetric air entry     Heart:    Regular rate and rhythm, S1 and S2 normal, no murmur, rub   or gallop, no edema    Skin:   Skin color, texture, turgor normal, no rashes or lesions

## 2021-05-30 NOTE — PROGRESS NOTES
Assessment and plan    1. Diabetes mellitus type 2 in obese (H) glycemic control is gradually improving blood sugars now in the 150s to 180s in the morning previously was in the 300 range.  His A1c is also fluctuating from 8 to above 11 his last visit with his primary doctor in July.  He is scheduled to see him again in the end of the month.  Discussed various options including supplementing the metformin with the use of Januvia he will think about that for now on we discussed his carbohydrate content in his diet.    2. Pain, dental patient has intermittent dental pain noted in the left upper jaw after receiving his crown.  No evidence of gingiva involvement.  He is seeing his dentist tomorrow.  He may require a root canal.    3. Diabetic ulcer of left midfoot associated with diabetes mellitus due to underlying condition, limited to breakdown of skin (H) he also has are healing he is on Bactrim no side effects noted, no additional breakdown of skin.        There are no Patient Instructions on file for this visit.    No orders of the defined types were placed in this encounter.    There are no discontinued medications.    No follow-ups on file.    CHIEF COMPLAINT:  Chief Complaint   Patient presents with     Dental issues     possible infection     Dizziness       HISTORY OF PRESENT ILLNESS:  Adam is a 39 y.o. male who is here today because she is been feeling feverish and ill.  He is noticed that his blood sugars have been fluctuating and the pain started throughout the weekend after he had a crown placed on Friday.  He did call his dentist who thinks the area may have been infected.  He is scheduled to see his dentist tomorrow.  He is been struggling with his blood sugars over the last few months.  He also was seen in the walk-in clinic for follow-up of diabetic ulcer on his left foot which are healing.  He reports that he is been taking the Bactrim regularly his blood sugars have been decreasing.    He is been  "using ibuprofen and Tylenol alternately to control the dental pain he admits that it is difficult to eat on the left side of his mouth.    REVIEW OF SYSTEMS:     General positive for fever chills this morning  HEENT positive for left-sided oral pain  10 point review of  All other systems are negative.    PFSH:    He is     TOBACCO USE:  Social History     Tobacco Use   Smoking Status Never Smoker   Smokeless Tobacco Never Used       VITALS:  Vitals:    07/17/19 1344   BP: 136/84   Pulse: 79   Resp: 20   Temp: 98  F (36.7  C)   TempSrc: Oral   SpO2: 98%   Weight: (!) 252 lb 1 oz (114.3 kg)   Height: 6' 1.5\" (1.867 m)     Wt Readings from Last 3 Encounters:   07/17/19 (!) 252 lb 1 oz (114.3 kg)   07/15/19 (!) 253 lb 14.4 oz (115.2 kg)   07/12/19 (!) 257 lb 2 oz (116.6 kg)       PHYSICAL EXAM:  Interactive  male sitting comes in exam no acute distress  HEENT neck supple mucous members moist no lymph enlargement noted in the neck.  Oral cavity shows no exudate no erythema no tenderness over the gingiva.  Count appears to be in place.  Respiratory system clear to auscultation equal breath sounds no wheeze no crackles  [facility] regular rate and rhythm no murmurs rubs or gallops  Skin 2 shallow well-demarcated grade 1 ulcers noted on the second and fourth toes of his left foot on the volar aspect only measuring 0.5 x 0.3 cm teeth are nontender no drainage noted    DATA REVIEWED:  Additional History from Old Records Summarized (2): 2  Reviewed clinic notes from walk-in clinic and PCP clinic note dated 7/15/2019  Decision to Obtain Records (1): 0  Radiology Tests Summarized or Ordered (1): 0  Labs Reviewed or Ordered (1): 1 last A1c above 11  Medicine Test Summarized or Ordered (1): 0  Independent Review of EKG or X-RAY(2 each): 0    The visit lasted a total of 20 minutes face to face with the patient. Over 50% of the time was spent counseling and educating the patient about   Diabetes foot ulcers and dental " pain.    MEDICATIONS:  Current Outpatient Medications   Medication Sig Dispense Refill     acetaminophen (TYLENOL) 500 MG tablet Take 500 mg by mouth every 6 (six) hours as needed for pain.       blood glucose meter (GLUCOMETER) Use 1 each As Directed as needed. Dispense glucometer brand per patient's insurance at pharmacy discretion. 1 each 0     blood glucose test strips Use 1 each As Directed daily as needed. Dispense brand per patient's insurance at pharmacy discretion.  Test two times a day 200 strip 3     generic lancets (FINGERSTIX LANCETS) Use 1 each As Directed daily as needed. Dispense brand per patient's insurance at pharmacy discretion. 100 each 3     ibuprofen (ADVIL,MOTRIN) 200 MG tablet Take 200 mg by mouth every 6 (six) hours as needed for pain.       metFORMIN (GLUCOPHAGE-XR) 500 MG 24 hr tablet TAKE 2 TABLETS BY MOUTH DAILY WITH BREAKFAST. 60 tablet 0     sulfamethoxazole-trimethoprim (BACTRIM DS) 800-160 mg per tablet Take 1 tablet by mouth 2 (two) times a day for 10 days. 20 tablet 0     No current facility-administered medications for this visit.      Data points 3      Please note that this clinical encounter uses voice recognition software, there may be typographical errors present

## 2021-05-30 NOTE — TELEPHONE ENCOUNTER
Triage call:     Patient is calling with concerns over dizziness that he experienced this morning after taking his medication. Patient has been in recently for Wounds on feet- on Bactrim DS. Also had a crown fitted last week- tooth ache pain- taking tylenol and Advil alternating every 6 hours. Has an appointment with Dentist tomorrow.     Started to get dizzy and didn't feel well this morning after taking his medication- tylenol and metformin. Tooth ache is barely coming in. Today he notes that he hasn't been drinking as much as normal. Also has been having issues with his blood sugar lately 's fasting- taking metformin- target range 150. Pulse 71 bpm. Feels ok now. Has been eating low carb and focusing on protein.     Patient would like to be seen to make sure that there is no infection that he needs to be concerned about.     Triaged to be seen today- reviewed additional care advice with patient and he verbalizes understanding. Appointment @ 1:45 pm with Dr Almendarez today.     Nalini Vazquez RN [facility] Triage/Med Refill 7/17/2019 11:12 AM    Reason for Disposition    Patient wants to be seen    Protocols used: DIZZINESS-A-OH

## 2021-05-30 NOTE — TELEPHONE ENCOUNTER
Please call to advise patient:  Extended antibiotic treatment beyond what has been initially recommended may not be warranted.  If there is any question that the infection may persist, would advise to follow-up in the office to recheck and evaluate.

## 2021-05-31 VITALS — BODY MASS INDEX: 32.49 KG/M2 | WEIGHT: 253.2 LBS | HEIGHT: 74 IN

## 2021-05-31 VITALS — BODY MASS INDEX: 32.46 KG/M2 | WEIGHT: 252.8 LBS

## 2021-05-31 NOTE — PROGRESS NOTES
Assessment/Plan:        1. Type 2 diabetes mellitus without complication, without long-term current use of insulin (H)  Recheck labs today  - Glycosylated Hemoglobin A1c  Continue on  - dulaglutide (TRULICITY) 0.75 mg/0.5 mL PnIj; Inject 0.75 mg under the skin every 7 days.  Dispense: 12 Syringe; Refill: 0    Add/increasing dose of metformin  - metFORMIN (GLUCOPHAGE-XR) 500 MG 24 hr tablet; Take 2 tablets (1,000 mg total) by mouth 2 (two) times a day.  Dispense: 360 tablet; Refill: 0    2. Diabetic ulcer of toe associated with type 2 diabetes mellitus, limited to breakdown of skin, unspecified laterality (H)  Continue current management    Follow-up in 3 months    Subjective:    Patient ID:   Adam Arce is a 39 y.o. male comes in for follow up        Type 2 diabetes mellitus and   Diabetic ulcer of toe    The blood sugars are tested to be consistently Below 140     Tolerating Trulicity and metformin    Needing a refill on both of these meds      As for the foot ulcers, being healing well without any additional concerns    Review of Systems  Allergy: reviewed  General : negative  A complete 5 point review of systems was obtained and is negative other than what is stated in the HPI.       The following patient's history were reviewed and updated as appropriate:   He  has a past medical history of Diet-controlled diabetes mellitus (H) and Overweight..      Outpatient Encounter Medications as of 8/28/2019   Medication Sig Dispense Refill     acetaminophen (TYLENOL) 500 MG tablet Take 500 mg by mouth every 6 (six) hours as needed for pain.       blood glucose meter (GLUCOMETER) Use 1 each As Directed as needed. Dispense glucometer brand per patient's insurance at pharmacy discretion. 1 each 0     blood glucose test strips Use 1 each As Directed daily as needed. Dispense brand per patient's insurance at pharmacy discretion.  Test two times a day 200 strip 3     dulaglutide (TRULICITY) 0.75 mg/0.5 mL PnIj Inject 0.75  mg under the skin every 7 days. 4 Syringe 0     generic lancets (FINGERSTIX LANCETS) Use 1 each As Directed daily as needed. Dispense brand per patient's insurance at pharmacy discretion. 100 each 3     ibuprofen (ADVIL,MOTRIN) 200 MG tablet Take 200 mg by mouth every 6 (six) hours as needed for pain.       metFORMIN (GLUCOPHAGE-XR) 500 MG 24 hr tablet Take 2 tablets (1,000 mg total) by mouth daily with breakfast. 60 tablet 0     terbinafine HCl (LAMISIL) 250 mg tablet Take 250 mg by mouth daily.  0     No facility-administered encounter medications on file as of 8/28/2019.          Objective:   /70 (Patient Site: Right Arm, Patient Position: Sitting, Cuff Size: Adult Large)   Pulse 63   Wt (!) 245 lb 3.2 oz (111.2 kg)   SpO2 97%   BMI 31.91 kg/m        Physical Exam  General Appearance:    Alert, cooperative, no distress,    Lungs:     Clear to auscultation bilaterally, respirations unlabored   Heart:    Regular rate and rhythm, S1 and S2 normal, no murmur, rub   or gallop   Extremities/Foot :   Extremities normal, atraumatic, no cyanosis or edema,     healing foot ulcers

## 2021-06-01 VITALS — HEIGHT: 74 IN | BODY MASS INDEX: 32.85 KG/M2 | WEIGHT: 256 LBS

## 2021-06-01 VITALS — WEIGHT: 253 LBS | HEIGHT: 74 IN | BODY MASS INDEX: 32.47 KG/M2

## 2021-06-03 VITALS — WEIGHT: 252.06 LBS | BODY MASS INDEX: 32.35 KG/M2 | HEIGHT: 74 IN

## 2021-06-03 VITALS — WEIGHT: 253.9 LBS | BODY MASS INDEX: 33.04 KG/M2

## 2021-06-03 VITALS — BODY MASS INDEX: 33.46 KG/M2 | WEIGHT: 257.13 LBS

## 2021-06-03 VITALS — WEIGHT: 245.2 LBS | BODY MASS INDEX: 31.91 KG/M2

## 2021-06-03 VITALS — BODY MASS INDEX: 32.5 KG/M2 | WEIGHT: 249.7 LBS

## 2021-06-04 VITALS
HEART RATE: 61 BPM | HEIGHT: 74 IN | OXYGEN SATURATION: 99 % | BODY MASS INDEX: 30.67 KG/M2 | WEIGHT: 239 LBS | DIASTOLIC BLOOD PRESSURE: 68 MMHG | SYSTOLIC BLOOD PRESSURE: 118 MMHG

## 2021-06-04 VITALS
SYSTOLIC BLOOD PRESSURE: 124 MMHG | HEART RATE: 73 BPM | HEIGHT: 74 IN | BODY MASS INDEX: 31.03 KG/M2 | DIASTOLIC BLOOD PRESSURE: 72 MMHG | WEIGHT: 241.8 LBS | OXYGEN SATURATION: 100 %

## 2021-06-04 VITALS
BODY MASS INDEX: 30.93 KG/M2 | WEIGHT: 241 LBS | SYSTOLIC BLOOD PRESSURE: 126 MMHG | HEIGHT: 74 IN | DIASTOLIC BLOOD PRESSURE: 70 MMHG

## 2021-06-04 VITALS — WEIGHT: 240 LBS | DIASTOLIC BLOOD PRESSURE: 80 MMHG | BODY MASS INDEX: 31.02 KG/M2 | SYSTOLIC BLOOD PRESSURE: 120 MMHG

## 2021-06-04 NOTE — TELEPHONE ENCOUNTER
Spoke with pt, he has a Px with Dr. LI next Friday, 1/10, but only has a few days left of his Metformin so he is wondering if he can get a fill now so he does not run out. I Let him know Dr. LI is gone until Monday but would send the refill request to the covering provider.

## 2021-06-05 VITALS
WEIGHT: 257 LBS | SYSTOLIC BLOOD PRESSURE: 136 MMHG | DIASTOLIC BLOOD PRESSURE: 84 MMHG | BODY MASS INDEX: 33.22 KG/M2 | OXYGEN SATURATION: 99 % | TEMPERATURE: 98.2 F | HEART RATE: 83 BPM

## 2021-06-05 NOTE — PROGRESS NOTES
MALE PREVENTATIVE EXAM    Assessment and Plan:       1. Routine general medical examination at a health care facility      2. Type 2 diabetes mellitus with mild nonproliferative retinopathy without macular edema, without long-term current use of insulin, unspecified laterality (H)    - Glycosylated Hemoglobin A1c  Improving A1c down to 6.6 from 7.6 from 4 months ago.  Continue current management as is with metformin 1000 mg twice a day and Trulicity 0.75 mg as previously directed with no changes at this time.  Follow diabetes diet recheck in 6 months    - Microalbumin, Random Urine  - Comprehensive Metabolic Panel      3. Hyperlipidemia, unspecified hyperlipidemia type  - Lipid Profile   Elevated triglycerides, cholesterol and low HDL  Recommending strict diabetic diet, cutting back on  carbs and fats.  Recheck in 6 months    4. Cough  Exam findings were discussed  Plan for a course of Z-Monet as directed  - azithromycin (ZITHROMAX Z-MONET) 250 MG tablet; Take 500 mg (2 x 250 mg tablets) on day 1 followed by 250 mg (1 tablet) on days 2-5.  Dispense: 6 tablet; Refill: 0     Close follow up if no significant change or improvement as anticipated.       Immunization Review  Adult Imm Review: No immunizations due today        I discussed the following with the patient:   Adult Healthy Living: Importance of regular exercise  Healthy nutrition        Subjective:   Chief Complaint: Adam Arce is an 39 y.o. male here with his wife for a preventative health visit.     Other concerns:   Follow up on DIABETES:   On trulicity and metformin 1000mg two times a day.  HgA1c of 7.6 as of 4 months ago.   Reports to having blood sugars ranging between , in the past week, and was recently had an eye exam with retinopathy due to diabetes and is said to be rechecking in follow-up in 4 months and ophthalmology.    He is also noted to have a cough for the past month starting on December 17, 2019 picking up something respiratory while  "visiting [place], and the cough has been lingering since settling in his chest.    He is noting to have a considerable amount of stress due to been employed and looking for another job.        Healthy Habits  Are you taking a daily aspirin? No  Do you typically exercising at least 40 min, 3-4 times per week?  NO  Do you usually eat at least 4 servings of fruit and vegetables a day, include whole grains and fiber and avoid regularly eating high fat foods? Yes depends on the day.    Have you had an eye exam in the past two years? Yes  Do you see a dentist twice per year? Yes  Do you have any concerns regarding sleep? No    Safety Screen  If you own firearms, are they secured in a locked gun cabinet or with trigger locks? The patient does not own any firearms  Do you feel you are safe where you are living?: Yes (7/17/2019  1:43 PM)  Do you feel you are safe in your relationship(s)?: Yes (7/17/2019  1:43 PM)      Review of Systems:  Please see above.  The rest of the review of systems are negative for all systems.     Cancer Screening     Patient has no health maintenance due at this time              History     Not marked as reviewed during this visit.            Objective:   Vital Signs:   Visit Vitals  /68 (Patient Site: Right Arm, Patient Position: Sitting, Cuff Size: Adult Large)   Pulse 61   Ht 6' 1.75\" (1.873 m)   Wt (!) 239 lb (108.4 kg)   SpO2 99%   BMI 30.89 kg/m           PHYSICAL EXAM  General Appearance:    Alert, cooperative, no distress, appears stated age   Head:    Normocephalic, without obvious abnormality, atraumatic   Eyes:    PERRL, conjunctiva/corneas clear, EOM's intact, fundi     benign, both eyes        Ears:    Normal TM's and external ear canals, both ears   Nose:   Nares normal, septum midline, mucosa normal, no drainage    or sinus tenderness   Throat:   Lips, mucosa, and tongue normal;  gums normal   Neck:   Supple, symmetrical, trachea midline, no adenopathy;        thyroid:  No " enlargement/tenderness/nodules; no carotid    bruit or JVD   Back:     Symmetric, no curvature, ROM normal, no CVA tenderness   Lungs:     Clear to auscultation bilaterally, respirations unlabored   Chest wall:    No tenderness or deformity   Heart:    Regular rate and rhythm, S1 and S2 normal, no murmur, rub   or gallop   Abdomen:     Soft, non-tender, bowel sounds active all four quadrants,     no masses, no organomegaly   Genitalia:    no penile lesions or discharge, no testicular masses or tenderness, no hernias   Extremities:   Extremities normal, atraumatic, no cyanosis or edema   Pulses:   2+ and symmetric all extremities   Skin:   Skin color, texture, turgor normal, no rashes or lesions  Nl feet.    Lymph nodes:   Cervical, supraclavicular, and axillary nodes normal   Neurologic:   CNII-XII intact. Normal strength, sensation and reflexes       throughout                 Additional Screenings Completed Today:

## 2021-06-07 NOTE — PROGRESS NOTES
Assessment/Plan:        1. Lower abdominal pain  Described as low grade discomfort and intermittent, brought on by physical activity.  No appreciable hernias on the exam.    Consider lower abdominal wall muscle strain.   Supportive care offered. Monitor symptoms   Close follow up if no significant change or improvement as anticipated.      2. Type 2 diabetes mellitus with mild nonproliferative retinopathy without macular edema, without long-term current use of insulin, unspecified laterality (H)  Recheck lab   - Glycosylated Hemoglobin A1c; Future        At the conclusion of the encounter the plan of care, disposition and all questions were answered and reviewed, and the patient acknowledged understanding and was involved in the decision making regarding the overall care plan.           Subjective:    Patient ID:   Adam Arce is a 40 y.o. male comes in follow up to the lower abdominal / scrotal pain.  Noting that he has No pain in the scrotum at this time, but Started having some discomfort in the lower mid abdomen on and off for the past few days, questioning hernia.  He did some heavy lifting ( logs) Last weekend, before noting the pain.  He has a brother with the Celiac disease and thinking that it might be that, went on gluten free diet with out change in the symptoms.   He notes the discomfort to be noted more on physical activity.      He is also doing well on the diabetes, keeping the BS below 120.     Review of Systems  Allergy: reviewed  General : negative  A complete 5 point review of systems was obtained and is negative other than what is stated in the HPI.      The following patient's history were reviewed and updated as appropriate:   He  has a past medical history of Diet-controlled diabetes mellitus (H) and Overweight..      Outpatient Encounter Medications as of 5/4/2020   Medication Sig Dispense Refill     blood glucose meter (GLUCOMETER) Use 1 each As Directed as needed. Dispense glucometer  "brand per patient's insurance at pharmacy discretion. 1 each 0     blood glucose test strips Use 1 each As Directed daily as needed. Dispense brand per patient's insurance at pharmacy discretion.  Test two times a day 200 strip 3     dulaglutide (TRULICITY) 0.75 mg/0.5 mL PnIj Inject 0.75 mg under the skin every 7 days. 12 Syringe 1     generic lancets (FINGERSTIX LANCETS) Use 1 each As Directed daily as needed. Dispense brand per patient's insurance at pharmacy discretion. 100 each 3     metFORMIN (GLUCOPHAGE-XR) 500 MG 24 hr tablet Take 2 tablets (1,000 mg total) by mouth 2 (two) times a day. 360 tablet 0     No facility-administered encounter medications on file as of 5/4/2020.          Objective:   /72 (Patient Site: Right Arm, Patient Position: Sitting, Cuff Size: Adult Regular)   Pulse 73   Ht 6' 1.75\" (1.873 m)   Wt (!) 241 lb 12.8 oz (109.7 kg)   SpO2 100%   BMI 31.26 kg/m        Physical Exam  General Appearance:    Alert,  no acute distress, well hydrated    Abdomen:      Soft, Non tender , non distended  , normal bowel sounds, no rebound or guarding, no masses, no hernias appreciated   Skin:   Skin color normal ,  no rashes or lesions      "

## 2021-06-07 NOTE — TELEPHONE ENCOUNTER
Spoke with pt. He had telephone visit scheduled on 4.29.20 with Dr. LI for F/U testicular pain, but was seen in clinic by Shelby Samaniego.   Offered pt to wait and in come into the clinic next week and see Dr. LI or do a virtual visit with Shelby as she saw him in person. He opted to see Dr. LI and appointment was made on Monday May 4th.     Pt expressed that the scrotum pain is not happening as much, but its moving more into his stomach. He has a family history of hernia's. In addition his brother got diagnosed with celiac disease. He is trying to pin point when the pain actually hits him. He did notice that this all started after some heavy lifting in the yard.

## 2021-06-07 NOTE — PROGRESS NOTES
"Adam Arce is a 40 y.o. male who is being evaluated via a billable video visit.      The patient has been notified of following:     \"This video visit will be conducted via a call between you and your physician/provider. We have found that certain health care needs can be provided without the need for an in-person physical exam.  This service lets us provide the care you need with a video conversation.  If a prescription is necessary we can send it directly to your pharmacy.  If lab work is needed we can place an order for that and you can then stop by our lab to have the test done at a later time.    Video visits are billed at different rates depending on your insurance coverage. Please reach out to your insurance provider with any questions.    If during the course of the call the physician/provider feels a video visit is not appropriate, you will not be charged for this service.\"    Patient has given verbal consent to a Video visit? Yes    Patient would like to receive their AVS by AVS Preference: Mattie.    Patient would like the video invitation sent by: Send to e-mail at: [email or web address]        Additional provider notes:    patient is calling with having an intermittent,  non escalating and non radiating pain in the groin/ scrotal region for the past 3-4 days.  He describes the pain as a dull ache / discomfort.  No report of fever, chills or lower abdominal pain.  He has no dysuria or frequency, and no groin trauma.  He is detecting no mass or hernia on self examination.     Of note, he has a history of vasectomy and the pain is similar to the pain following the procedure.     1. Testicular pain     based on the presenting symptoms would like to rule out torsion/ partial torsion vs  Epididymitis/ spermatocele, or other anatomical culprit.     Plan:   Test/scrotal US  We will follow-up to the results of the study and manage accordingly.    May consider an in person OV for further evaluation and " management if US non conclusive.     Video-Visit Details  Converted to Tele-visit with duration of 19 min.       Tong You MD

## 2021-06-07 NOTE — PROGRESS NOTES
No sacral area cocerns, please call results to the patient or send a letter if not reachable by phone.

## 2021-06-07 NOTE — TELEPHONE ENCOUNTER
Last office visit:1/10/20    Assessment and Plan:         1. Routine general medical examination at a health care facility        2. Type 2 diabetes mellitus with mild nonproliferative retinopathy without macular edema, without long-term current use of insulin, unspecified laterality (H)     - Glycosylated Hemoglobin A1c  Improving A1c down to 6.6 from 7.6 from 4 months ago.  Continue current management as is with metformin 1000 mg twice a day and Trulicity 0.75 mg as previously directed with no changes at this time.  Follow diabetes diet recheck in 6 months     - Microalbumin, Random Urine  - Comprehensive Metabolic Panel        3. Hyperlipidemia, unspecified hyperlipidemia type  - Lipid Profile   Elevated triglycerides, cholesterol and low HDL  Recommending strict diabetic diet, cutting back on  carbs and fats.  Recheck in 6 months     4. Cough  Exam findings were discussed  Plan for a course of Z-Monet as directed  - azithromycin (ZITHROMAX Z-MONET) 250 MG tablet; Take 500 mg (2 x 250 mg tablets) on day 1 followed by 250 mg (1 tablet) on days 2-5.  Dispense: 6 tablet; Refill: 0      Close follow up if no significant change or improvement as anticipated.         Immunization Review  Adult Imm Review: No immunizations due today    I discussed the following with the patient:   Adult Healthy Living: Importance of regular exercise  Healthy nutrition         Last refill:1/10/20    metFORMIN (GLUCOPHAGE-XR) 500 MG 24 hr tablet  180 tablet  1  1/10/2020   No    Sig - Route: Take 2 tablets (1,000 mg total) by mouth 2 (two) times a day. - Oral    Sent to pharmacy as: metFORMIN  mg tablet,extended release 24 hr (GLUCOPHAGE-XR)    E-Prescribing Status: Receipt confirmed by pharmacy (1/10/2020  1:41 PM CST)

## 2021-06-07 NOTE — PROGRESS NOTES
CHIEF COMPLAINT:  Chief Complaint   Patient presents with     Follow-up     testicular pain        HPI:  Adam Arce is a male who is seen for new testicular pain, he had an ultrasound yesterday after phone visit with his primary doctor.  Ultrasound was negative with exception of a smaller left testicle than right.  He has a history of undescended testicle as a small child.  He does testicular exams monthly.  He notes no lumps or tender areas of concern.  He denies any dysuria, penile exudates, erectile dysfunction, dyspareunia, does have some urgency and frequency.  Denies difficulty starting and stopping flow.  He has had a history of plantar surface burning sensation, this was thought to be diabetic nephropathy.  He continues to watch his diet carefully, takes his metformin daily, his A1 C was 6.6 in January.  He does note that his blood sugar was a little elevated from his normal fasting range this morning, was 150 mg/dL, he states he did eat a few more carbs yesterday.  He has had a history of occasional lumbar back pain with activities such as walking or yard work.  He denies any additional nephropathies of his extremities with activity.  Review of systems: As in HPI and negative for fever, cough, congestion, myalgias, fatigue, malaise.  He has a history of vasectomy, also history of adult circumcision.  He denies history of chronic yeast infections in the genital area, although his circumcision was done for chronic foreskin inflammation.  He had an eye exam in early 2019 which showed some splinter hemorrhages in the retina.  Before that time, he had done an online visit for erectile dysfunction medication and had been trying this but once he found out that he was having retinal changes he stopped this medicine.  He was given a Viagra-like medication.  He denies true erectile dysfunction, does have normal erections, but at times has difficulty with intimacy with his spouse.  He has no concerns for STIs, has  "had one partner for many years, no STI infections.  Lab Results   Component Value Date    HGBA1C 6.6 (H) 01/10/2020         PREVIOUS MEDICAL HISTORY  Past Medical History:   Diagnosis Date     Diet-controlled diabetes mellitus (H)           Overweight     Created by Conversion        PREVIOUS SURGICAL HISTORY  Past Surgical History:   Procedure Laterality Date     NO PAST SURGERIES       CURRENT MEDICATIONS  Current Outpatient Medications on File Prior to Visit   Medication Sig Dispense Refill     blood glucose meter (GLUCOMETER) Use 1 each As Directed as needed. Dispense glucometer brand per patient's insurance at pharmacy discretion. 1 each 0     blood glucose test strips Use 1 each As Directed daily as needed. Dispense brand per patient's insurance at pharmacy discretion.  Test two times a day 200 strip 3     dulaglutide (TRULICITY) 0.75 mg/0.5 mL PnIj Inject 0.75 mg under the skin every 7 days. 12 Syringe 1     generic lancets (FINGERSTIX LANCETS) Use 1 each As Directed daily as needed. Dispense brand per patient's insurance at pharmacy discretion. 100 each 3     metFORMIN (GLUCOPHAGE-XR) 500 MG 24 hr tablet Take 2 tablets (1,000 mg total) by mouth 2 (two) times a day. 360 tablet 0     No current facility-administered medications on file prior to visit.        ALLERGIES  No Known Allergies    PROBLEM LIST  Patient Active Problem List   Diagnosis     Overweight     Type 2 diabetes mellitus with diabetic mononeuropathy (H)     Hyperlipidemia       SOCIAL HISTORY  Relationships   Social connections     Talks on phone: Not on file     Gets together: Not on file     Attends Taoism service: Not on file     Active member of club or organization: Not on file     Attends meetings of clubs or organizations: Not on file     Relationship status: Not on file       OBJECTIVE:  /70 (Patient Site: Right Arm, Patient Position: Sitting, Cuff Size: Adult Regular)   Ht 6' 1.75\" (1.873 m)   Wt (!) 241 lb (109.3 kg)   BMI " 31.15 kg/m      General: Shows alert and oriented, well-nourished, well-developed, pleasant  male, NAD.Hydration: Good  Vital Signs: Pulse 143   Temp 98.2  F (36.8  C)   Wt 28 lb (12.7 kg)   SpO2 96%   General: Appears well, in no apparent distress.   Mouth: mucous membranes moist   exam, normal-appearing circumcised male, no areas of erythema along the edge of retained foreskin, no erythema of testicular area, testicle is non-nodular, without tenderness, epididymis is without edema or tenderness.  Minimal cremaster response.  Skin: No erythema, edema, skin breaks, exudates of any of the skin surfaces of the genital area.  No ulcerations.    WORKUP:    ASSESSMENT/PLAN:  1. Urinary frequency  Urinalysis-UC if Indicated    XR Lumbar Spine W Flex and Ext 6 or More VWS    XR Sacrum and Coccyx 2 or More VWS    XR Lumbar Spine W Flex and Ext 6 or More VWS    XR Sacrum and Coccyx 2 or More VWS    fluconazole (DIFLUCAN) 150 MG tablet    PSA, Diagnostic (Prostatic-Specific Antigen)   2. Testicular pain  PSA, Diagnostic (Prostatic-Specific Antigen)   Discussed the possibility that his testicular pain and his chronic foot pain can relate to lumbar disc degeneration, will get lumbar sacral x-rays today and he will follow-up with his primary doctor in 2 weeks at phone consult for this concern.  Will check UA and PSA, if PSA is elevated or UA shows possible infection will treat with antibiotics.  Also give 1 dose of Diflucan if starting antibiotics.    Recheck as needed for persistence, worsening, appearance of new symptoms with Tong You MD Ruth Harriet McGowan 12/11/2018 6:03 AM

## 2021-06-07 NOTE — PROGRESS NOTES
Loss of normal lumbar curve and some endplate arthritis seen on some of the vertebral bodies.  The disc areas look normal.  Please call results to the patient or send a letter if not reachable by phone.

## 2021-06-09 NOTE — TELEPHONE ENCOUNTER
Refill Request: Metformin   Last filled: 3.27.20 disp 360 no refills  Last visit: 1.10.2020  Type 2 diabetes mellitus with mild nonproliferative retinopathy without macular edema, without long-term current use of insulin, unspecified laterality (H)     - Glycosylated Hemoglobin A1c  Improving A1c down to 6.6 from 7.6 from 4 months ago.  Continue current management as is with metformin 1000 mg twice a day and Trulicity 0.75 mg as previously directed with no changes at this time.  Follow diabetes diet recheck in 6 months    No upcoming appointments.

## 2021-06-09 NOTE — TELEPHONE ENCOUNTER
Pt has had his labs done.  Would like refill on Trulicity at this time.    Teed up to [facility] as requested

## 2021-06-10 NOTE — TELEPHONE ENCOUNTER
Refill Request: Metformin   Last refilled: 7.3.2020 [id number] no refills  Last visit: 5.4.2020    Type 2 diabetes mellitus with mild nonproliferative retinopathy without macular edema, without long-term current use of insulin, unspecified laterality (H)  Recheck lab   - Glycosylated Hemoglobin A1c; Future      No upcoming appts.

## 2021-06-10 NOTE — TELEPHONE ENCOUNTER
FYI - Status Update  Who is Calling: Pharmacy  Update: Insurance is asking for 90 day supply, #360   Okay to leave a detailed message?:  No return call needed

## 2021-06-10 NOTE — TELEPHONE ENCOUNTER
Last Office Visit  5/4/2020 Tong You MD  Notes:  1. Lower abdominal pain  Described as low grade discomfort and intermittent, brought on by physical activity.  No appreciable hernias on the exam.    Consider lower abdominal wall muscle strain.   Supportive care offered. Monitor symptoms   Close follow up if no significant change or improvement as anticipated.       2. Type 2 diabetes mellitus with mild nonproliferative retinopathy without macular edema, without long-term current use of insulin, unspecified laterality (H)  Recheck lab   - Glycosylated Hemoglobin A1c; Future          Last Filled:      metFORMIN (GLUCOPHAGE-XR) 500 MG 24 hr tablet  180 tablet  0  7/3/2020   No    Sig - Route: Take 2 tablets (1,000 mg total) by mouth 2 (two) times a day. - Oral    Sent to pharmacy as: metFORMIN  mg tablet,extended release 24 hr (GLUCOPHAGE-XR)    E-Prescribing Status: Receipt confirmed by pharmacy (7/3/2020  1:40 PM CDT)        Next OV:  Visit date not found    Lab Results   Component Value Date    HGBA1C 5.8 07/09/2020         Medication teed up for provider signature

## 2021-06-11 NOTE — TELEPHONE ENCOUNTER
EXAM DATE:         09/02/2020     EXAM: CT ABDOMEN, PELVIS W/O CONTRAST  LOCATION: [facility]  DATE/TIME: 9/2/2020 4:30 PM     INDICATION: Abdominal pain.  COMPARISON: None.  TECHNIQUE: CT scan of the abdomen and pelvis was performed without oral or IV  contrast. Multiplanar reformats were obtained. Dose reduction techniques were  used.  CONTRAST: None.     FINDINGS:  LOWER CHEST: Normal.     HEPATOBILIARY: No focal hepatic masses. No radiopaque gallstone or biliary  dilatation. Probable mild diffuse fatty infiltration of the liver.     PANCREAS: Normal.     SPLEEN: Normal.     ADRENAL GLANDS: Normal.     KIDNEY/BLADDER: No hydronephrosis or renal or ureteral stones on either side. No  abnormal renal masses. No bladder stones or masses.     BOWEL: No free intraperitoneal air or fluid. The large bowel is normal in  caliber. There is some mildly distended fluid-filled loop of small bowel in the  left mid to lower abdomen. On image 94 of series 3 in the fluid-filled small  bowel loop in the left lateral abdomen also seen on coronal image 50 there is a  small central lucency this could represent a minimal intussusception no  high-grade bowel obstruction is seen. The this could represent an early or  low-grade obstruction and follow-up study may be helpful if symptoms persist. No  free fluid no pneumatosis intestinalis no focal inflammatory process to suggest  diverticulitis or appendicitis.     LYMPH NODES: Normal.     VASCULATURE: Unremarkable.     PELVIC ORGANS: Normal.     MUSCULOSKELETAL: Normal.     IMPRESSION:  1.  Mildly distended fluid-filled loops of small bowel in the left mid to lower  abdomen on one image there is a possible jejunal jejunal intussusception and if  symptoms persist follow-up study may be helpful to rule out small bowel  obstruction at this point no high-grade obstruction is seen there is no CT  evidence for bowel ischemia, free intraperitoneal air or fluid or  appendicitis  or diverticulitis.        Please result per patient request

## 2021-06-11 NOTE — PROGRESS NOTES
Assessment/Plan:        1. Type 2 diabetes mellitus with mild nonproliferative retinopathy without macular edema, without long-term current use of insulin, unspecified laterality (H)  tx options and various formulations of metformin were reviewed.   He has always been prescribed XR formulation and taking this twice a day, continuing  to have loose bowels/diarrhea.  Considering the cost and insurance coverage, and given the above scenario, will have him  the regular formulation of metformin and to take Imodium to offset the diarrhea.     - metFORMIN (GLUCOPHAGE) 1000 MG tablet; Take 1 tablet (1,000 mg total) by mouth 2 (two) times a day with meals.  Dispense: 60 tablet; Refill: 0    2. Anxiety  Treatment options were reviewed  Plan:     - citalopram (CELEXA) 20 MG tablet; Take 1 tablet (20 mg total) by mouth daily.  Dispense: 30 tablet; Refill: 0  Recheck in a month     3. Upper abdominal pain  Exam findings were discussed  Plan:    - CT Abdomen Pelvis Without Oral Without IV Contrast  - Comprehensive Metabolic Panel  - Lipase   We will follow-up to the results of the study and manage accordingly.         At the conclusion of the encounter the plan of care, disposition and all questions were answered and reviewed, and the patient acknowledged understanding and was involved in the decision making regarding the overall care plan.     Patient Care Team:  Tong You MD as PCP - General (Family Medicine)  Tong You MD as Assigned PCP    45  minutes spent on this visit with more than 50% time spent on reviewing medical record, education, providing supportive therapy regarding coping with chronic illness, entering orders, reviewing and commenting on the test results, and preparing documentation for the visit           Subjective:    Patient ID:   Adam Arce is a 40 y.o. male comes in for follow up , med check / review:        1. Type 2 diabetes mellitus with mild nonproliferative  retinopathy without macular edema, without long-term current use of insulin, unspecified laterality (H)     The pharmacy has not been filling the correct amount of metformin for him, thinking that maybe the insurance reason.  He has been taking 1000 mg of metformin XR formulation twice a day, and notes to be getting loose stools/diarrhea throughout the day       2.   Anxious  More so recently, and cannot pinpoint as to why or the reason  He is constantly worried about things as it seems like.      3. Upper abdominal pain noted for the past 2 weeks described as light pains not the same as the type of pain he has described in the past but is now more so located in the upper abdomen.  He notes that has been having less of a fluctuance but belching has been increased in relieving the pressure.  He notes that his appetite is been decreased recently from being overly anxious not been able to eat much and therefore his blood sugars been higher            Review of Systems  Allergy: reviewed  General : negative  A complete 5 point review of systems was obtained and is negative other than what is stated in the HPI.      The following patient's history were reviewed and updated as appropriate:   He  has a past medical history of Diet-controlled diabetes mellitus (H) and Overweight..      Outpatient Encounter Medications as of 9/2/2020   Medication Sig Dispense Refill     blood glucose meter (GLUCOMETER) Use 1 each As Directed as needed. Dispense glucometer brand per patient's insurance at pharmacy discretion. 1 each 0     blood glucose test strips Use 1 each As Directed daily as needed. Dispense brand per patient's insurance at pharmacy discretion.  Test two times a day 200 strip 3     dulaglutide (TRULICITY) 0.75 mg/0.5 mL PnIj Inject 0.75 mg under the skin every 7 days. 12 Syringe 1     generic lancets (FINGERSTIX LANCETS) Use 1 each As Directed daily as needed. Dispense brand per patient's insurance at pharmacy discretion.  100 each 3     metFORMIN (GLUCOPHAGE-XR) 500 MG 24 hr tablet Take 2 tablets (1,000 mg total) by mouth 2 (two) times a day. 180 tablet 0     metFORMIN (GLUCOPHAGE-XR) 500 MG 24 hr tablet Take 2 tablets (1,000 mg total) by mouth 2 (two) times a day. 180 tablet 0     No facility-administered encounter medications on file as of 9/2/2020.          Objective:   /80   Wt (!) 240 lb (108.9 kg)   BMI 31.02 kg/m        Physical Exam  General Appearance:    Alert,  no acute distress, well hydrated    Eyes:    PERRL, conjunctiva/corneas clear, non icterus    Throat:   Lips, mucosa, and tongue normal;  gums normal   Neck:   Supple, symmetrical, trachea midline, no adenopathy;        thyroid:  No enlargement/tenderness/nodules; no carotid    bruit or JVD   Lungs:     Clear to auscultation bilaterally, respirations unlabored   Heart:    Regular rate and rhythm, S1 and S2 normal, no murmur, rub   or gallop   Abdomen:      Soft, palpable tenderness to upper abdomen ( no localization )  , normal bowel sounds, no rebound or guarding, no masses, no organomegaly   Extremities:   Extremities normal, atraumatic, no cyanosis or edema   Skin:   Skin color, texture, turgor normal, no rashes or lesions

## 2021-06-11 NOTE — TELEPHONE ENCOUNTER
Test Results  Who is calling?:  Patient   Who ordered the test:  Tong You MD   Type of test: Imaging  Date of test:  9/02/2020  Where was the test performed[facility]  What are your questions/concerns?:  Patient is very nervous about his results and unable to focus while at work. He is requesting a call back as soon as possible to discuss findings.  Okay to leave a detailed message?:  Yes

## 2021-06-11 NOTE — TELEPHONE ENCOUNTER
Last Office Visit  5/4/2020 Tong You MD  Notes:  1. Lower abdominal pain  Described as low grade discomfort and intermittent, brought on by physical activity.  No appreciable hernias on the exam.    Consider lower abdominal wall muscle strain.   Supportive care offered. Monitor symptoms   Close follow up if no significant change or improvement as anticipated.       2. Type 2 diabetes mellitus with mild nonproliferative retinopathy without macular edema, without long-term current use of insulin, unspecified laterality (H)  Recheck lab   - Glycosylated Hemoglobin A1c; Future     Last Filled:  metFORMIN (GLUCOPHAGE-XR) 500 MG 24 hr tablet  180 tablet  0  8/13/2020   No    Sig - Route: Take 2 tablets (1,000 mg total) by mouth 2 (two) times a day. - Oral    Sent to pharmacy as: metFORMIN  mg tablet,extended release 24 hr (GLUCOPHAGE-XR)    E-Prescribing Status: Receipt confirmed by pharmacy (8/13/2020  4:56 PM CDT)        Next OV:  Visit date not found        Medication teed up for provider signature

## 2021-06-11 NOTE — TELEPHONE ENCOUNTER
RN cannot approve Refill Request    RN can NOT refill this medication RECENTLY REFILLED FOR 45 DAY SUPPLY . Last office visit: 5/4/2020 Tong You MD Last Physical: 1/10/2020 Last MTM visit: Visit date not found Last visit same specialty: 5/4/2020 Tong You MD.  Next visit within 3 mo: Visit date not found  Next physical within 3 mo: Visit date not found      [facility] Triage/Med Refill 8/30/2020    Requested Prescriptions   Pending Prescriptions Disp Refills     metFORMIN (GLUCOPHAGE-XR) 500 MG 24 hr tablet 60 tablet 0     Sig: Take 2 tablets (1,000 mg total) by mouth 2 (two) times a day.       Metformin Refill Protocol Passed - 8/28/2020  1:16 PM        Passed - Blood pressure in last 12 months     BP Readings from Last 1 Encounters:   05/04/20 124/72             Passed - LFT or AST or ALT in last 12 months     Albumin   Date Value Ref Range Status   01/10/2020 4.4 3.5 - 5.0 g/dL Final     Bilirubin, Total   Date Value Ref Range Status   01/10/2020 0.8 0.0 - 1.0 mg/dL Final     Alkaline Phosphatase   Date Value Ref Range Status   01/10/2020 83 45 - 120 U/L Final     AST   Date Value Ref Range Status   01/10/2020 25 0 - 40 U/L Final     ALT   Date Value Ref Range Status   01/10/2020 53 (H) 0 - 45 U/L Final     Protein, Total   Date Value Ref Range Status   01/10/2020 8.2 (H) 6.0 - 8.0 g/dL Final                Passed - GFR or Serum Creatinine in last 6 months     GFR MDRD Non Af Amer   Date Value Ref Range Status   01/10/2020 >60 >60 mL/min/1.73m2 Final     GFR MDRD Af Amer   Date Value Ref Range Status   01/10/2020 >60 >60 mL/min/1.73m2 Final             Passed - Visit with PCP or prescribing provider visit in last 6 months or next 3 months     Last office visit with prescriber/PCP: 5/4/2020 OR same dept: 5/4/2020 Tong You MD OR same specialty: 5/4/2020 Tong You MD Last physical: Visit date not found Last MTM visit: Visit date not found          Next appt within 3 mo: Visit date not found  Next physical within 3 mo: Visit date not found  Prescriber OR PCP: Tong You MD  Last diagnosis associated with med order: 1. Type 2 diabetes mellitus with mild nonproliferative retinopathy without macular edema, without long-term current use of insulin, unspecified laterality (H)  - metFORMIN (GLUCOPHAGE-XR) 500 MG 24 hr tablet; Take 2 tablets (1,000 mg total) by mouth 2 (two) times a day.  Dispense: 60 tablet; Refill: 0     If protocol passes may refill for 12 months if within 3 months of last provider visit (or a total of 15 months).           Passed - A1C in last 6 months     Hemoglobin A1c   Date Value Ref Range Status   07/09/2020 5.8 3.5 - 6.0 % Final               Passed - Microalbumin in last year      Microalbumin, Random Urine   Date Value Ref Range Status   01/10/2020 0.84 0.00 - 1.99 mg/dL Final

## 2021-06-12 NOTE — TELEPHONE ENCOUNTER
Refill Request  Did you contact pharmacy: Yes  Medication name:   Requested Prescriptions     Pending Prescriptions Disp Refills     metFORMIN (GLUCOPHAGE-XR) 500 MG 24 hr tablet 180 tablet 0     Sig: Take 2 tablets (1,000 mg total) by mouth 2 (two) times a day.     Who prescribed the medication: Tong You MD  Requested Pharmacy: [facility]  Is patient out of medication: N/A  Patient notified refills processed in 3 business days:  N/A  Okay to leave a detailed message: yes

## 2021-06-12 NOTE — TELEPHONE ENCOUNTER
Refill Approved    Rx renewed per Medication Renewal Policy. Medication was last renewed on 8/13/20.    Milena Hannon, [facility] Triage/Med Refill 10/13/2020     Requested Prescriptions   Pending Prescriptions Disp Refills     metFORMIN (GLUCOPHAGE-XR) 500 MG 24 hr tablet 180 tablet 0     Sig: Take 2 tablets (1,000 mg total) by mouth 2 (two) times a day.       Metformin Refill Protocol Passed - 10/11/2020  3:13 PM        Passed - Blood pressure in last 12 months     BP Readings from Last 1 Encounters:   09/02/20 120/80             Passed - LFT or AST or ALT in last 12 months     Albumin   Date Value Ref Range Status   09/02/2020 4.7 3.5 - 5.0 g/dL Final     Bilirubin, Total   Date Value Ref Range Status   09/02/2020 1.2 (H) 0.0 - 1.0 mg/dL Final     Alkaline Phosphatase   Date Value Ref Range Status   09/02/2020 69 45 - 120 U/L Final     AST   Date Value Ref Range Status   09/02/2020 27 0 - 40 U/L Final     ALT   Date Value Ref Range Status   09/02/2020 46 (H) 0 - 45 U/L Final     Protein, Total   Date Value Ref Range Status   09/02/2020 8.7 (H) 6.0 - 8.0 g/dL Final                Passed - GFR or Serum Creatinine in last 6 months     GFR MDRD Non Af Amer   Date Value Ref Range Status   09/02/2020 >60 >60 mL/min/1.73m2 Final     GFR MDRD Af Amer   Date Value Ref Range Status   09/02/2020 >60 >60 mL/min/1.73m2 Final             Passed - Visit with PCP or prescribing provider visit in last 6 months or next 3 months     Last office visit with prescriber/PCP: 9/2/2020 OR same dept: 9/2/2020 Tong You MD OR same specialty: 9/2/2020 Tong You MD Last physical: Visit date not found Last MTM visit: Visit date not found         Next appt within 3 mo: Visit date not found  Next physical within 3 mo: Visit date not found  Prescriber OR PCP: Tong You MD  Last diagnosis associated with med order: 1. Type 2 diabetes mellitus with mild nonproliferative retinopathy without macular edema,  without long-term current use of insulin, unspecified laterality (H)  - metFORMIN (GLUCOPHAGE-XR) 500 MG 24 hr tablet; Take 2 tablets (1,000 mg total) by mouth 2 (two) times a day.  Dispense: 180 tablet; Refill: 0     If protocol passes may refill for 12 months if within 3 months of last provider visit (or a total of 15 months).           Passed - A1C in last 6 months     Hemoglobin A1c   Date Value Ref Range Status   07/09/2020 5.8 3.5 - 6.0 % Final               Passed - Microalbumin in last year      Microalbumin, Random Urine   Date Value Ref Range Status   01/10/2020 0.84 0.00 - 1.99 mg/dL Final

## 2021-06-13 NOTE — TELEPHONE ENCOUNTER
Patient need to self isolate only if testing positive   
Relayed message to pt  
Who is calling:  Patient  Reason for Call:  Had indirect exposure to Covid. Stated he was the third party in contact from a child that goes to school with his son that did test positive.  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      
worsening

## 2021-06-14 NOTE — PROGRESS NOTES
Assessment/Plan:        1. Routine physical examination  - Basic Metabolic Panel  - Lipid Profile    2. Diet-controlled diabetes mellitus  Check:   - Glycosylated Hemoglobin A1c    Discussed weight loss.     Will follow up pending the study and manage accordingly.       ______________________________________________________________________     Adam Arce is a 37 y.o. male coming in today for a routine physical.      Other concerns to address today:       Diet-controlled diabetes mellitus      h/o and mainly diet controlled. Not checking BG   He has once treated on metformin    Has not had a lab work for a while.          Past Surgical History:   Procedure Laterality Date     ORCHIOPEXY       VASECTOMY          Family History   Problem Relation Age of Onset     No Medical Problems Mother      Diabetes Father      No Medical Problems Brother        Social History     Social History     Marital status:      Spouse name: N/A     Number of children: N/A     Years of education: N/A     Social History Main Topics     Smoking status: Never Smoker     Smokeless tobacco: Never Used     Alcohol use Yes      Comment: socially      Drug use: No     Sexual activity: Yes     Partners: Female      Comment: 3 children      Other Topics Concern     None     Social History Narrative     None        Current Outpatient Prescriptions   Medication Sig     metFORMIN (GLUCOPHAGE XR) 500 MG 24 hr tablet Take 2 tablets (1,000 mg total) by mouth daily with breakfast.       Immunization History   Administered Date(s) Administered     Influenza, seasonal,quad inj 6-35 mos 09/14/2012     Influenza,seasonal quad, PF, 36+MOS 11/29/2017     MMR 04/28/1994, 09/01/1994     Td, Adult, Absorbed 04/28/1998     Tdap 09/14/2012      ______________________________________________________________________     Review of Systems -   General : negative  Ophthalmic : negative  ENT : negative  Respiratory : no cough, shortness of breath, or  "wheezing  Cardiovascular : no chest pain or dyspnea on exertion  Gastrointestinal : no abdominal pain, change in bowel habits, or black or bloody stools  Genito-Urinary :  no dysuria, trouble voiding, or hematuria  Musculoskeletal : negative  Neurological : negative  Dermatological : negative    Allergy and Immunology : negative  Hematological and Lymphatic : negative  Endocrine : negative      ______________________________________________________________________     Exam:    Wt Readings from Last 3 Encounters:   11/29/17 (!) 253 lb 3.2 oz (114.9 kg)     BP Readings from Last 3 Encounters:   11/29/17 124/84      /84 (Patient Site: Right Arm, Patient Position: Sitting, Cuff Size: Adult Regular)  Pulse 83  Temp 97.6  F (36.4  C) (Oral)   Ht 6' 2\" (1.88 m)  Wt (!) 253 lb 3.2 oz (114.9 kg)  SpO2 98%  BMI 32.51 kg/m2        General appearance - alert, well appearing, and in no distress  Mental status - alert, oriented to person, place, and time  Eyes - pupils equal and reactive, extraocular eye movements intact  Ears - bilateral TM's and external ear canals normal  Nose - normal and patent, no erythema, discharge or polyps  Mouth - mucous membranes moist, pharynx normal without lesions  Neck - supple, no significant adenopathy, thyroid exam: thyroid is normal in size without nodules or tenderness  Lymphatics - no palpable lymphadenopathy  Chest - clear to auscultation, no wheezes, rales or rhonchi, symmetric air entry  Heart - normal rate, regular rhythm, normal S1, S2, no murmurs, rubs, clicks or gallops  Abdomen - soft, nontender, nondistended, no masses or organomegaly   Male - no penile lesions or discharge, no testicular masses or tenderness, no hernias  Back exam - full range of motion, no tenderness, palpable spasm or pain on motion  Neurological - alert, oriented, normal speech, no focal findings or movement / tremor disorder noted  Musculoskeletal - no joint tenderness, deformity or " swelling  Extremities - peripheral pulses normal, no pedal edema, no clubbing or cyanosis  Skin - normal coloration and turgor, no rashes, no suspicious skin lesions noted

## 2021-06-14 NOTE — PROGRESS NOTES
Assessment/Plan:        1. Type 2 diabetes mellitus without complication, without long-term current use of insulin    - discussed diet and exercise, watching portions.   - monitor BG daily, keep average below 130     Plan:     - metFORMIN (GLUCOPHAGE XR) 500 MG 24 hr tablet; Take 2 tablets (1,000 mg total) by mouth daily with breakfast.  Dispense: 60 tablet; Refill: 0  - blood glucose meter (GLUCOMETER); Use 1 each As Directed as needed. Dispense glucometer brand per patient's insurance at pharmacy discretion.  Dispense: 1 each; Refill: 0  - blood glucose test strips; Use 1 each As Directed daily as needed. Dispense brand per patient's insurance at pharmacy discretion.  Dispense: 100 strip; Refill: 3  - generic lancets (FINGERSTIX LANCETS); Use 1 each As Directed daily as needed. Dispense brand per patient's insurance at pharmacy discretion.  Dispense: 100 each; Refill: 3    - Microalbumin, Random Urine    2. Hyperlipidemia  Discussed starting Statin and potential side effects reviewed.     - Lipid Profile      Will follow up pending the study and manage accordingly.         Total time spent with patient was 30  minutes with >50% of time spent in face-to-face counseling regarding the above plan.          Subjective:    Patient ID:   Adam Arce is a 37 y.o. male comes in follow up abnormal cholesterol and BG.   He has since been changing his diet and more active physically.     He has started the metformin without significant side effects.         allergies, current medications and problem list.    Review of Systems  A complete 10 point review of systems was obtained and is negative other than what is stated in the HPI.           Objective:   /84 (Patient Site: Right Arm, Patient Position: Sitting, Cuff Size: Adult Regular)  Pulse 71  Temp 97.7  F (36.5  C) (Oral)   Wt (!) 252 lb 12.8 oz (114.7 kg)  SpO2 98%  BMI 32.46 kg/m2      Physical Exam  General: no apparent distress.   Skin: no rash

## 2021-06-15 NOTE — PROGRESS NOTES
"    Assessment & Plan     Type 2 diabetes mellitus with mild nonproliferative retinopathy without macular edema, without long-term current use of insulin, unspecified laterality (H)  Diabetic mononeuropathy associated with type 2 diabetes mellitus (H)        Recheck labs  - Glycosylated Hemoglobin A1c  - Microalbumin, Random Urine  - Lipid Profile      Continue current management  - metFORMIN (GLUCOPHAGE) 1000 MG tablet; Take 1 tablet (1,000 mg total) by mouth 2 (two) times a day with meals.           BMI:   Estimated body mass index is 33.22 kg/m  as calculated from the following:    Height as of 5/4/20: 6' 1.75\" (1.873 m).    Weight as of this encounter: 257 lb (116.6 kg).     Return in about 6 months (around 8/17/2021) for or sooner with any issues or concerns.    Tong You MD  [facility]   Adam Arce is 40 y.o. and presents today for diabetes med check and follow-up.  He is on Trulicity, and Metformin with a last A1c of  Lab Results   Component Value Date    HGBA1C 5.8 07/09/2020      Overall Doing well and having blood glucose ranging in the 130's.   He is striving to lose a few more pounds.           Objective    /86 (Patient Position: Sitting)   Pulse 83   Temp 98.2  F (36.8  C)   Wt (!) 257 lb (116.6 kg)   SpO2 99%   BMI 33.22 kg/m    Body mass index is 33.22 kg/m .     Physical Exam                "

## 2021-06-15 NOTE — PATIENT INSTRUCTIONS - HE
1. Type 2 diabetes mellitus with mild nonproliferative retinopathy without macular edema, without long-term current use of insulin, unspecified laterality (H)  - metFORMIN (GLUCOPHAGE) 1000 MG tablet; Take 1 tablet (1,000 mg total) by mouth 2 (two) times a day with meals.  Dispense: 180 tablet; Refill: 1  - Glycosylated Hemoglobin A1c  - Microalbumin, Random Urine  - Lipid Profile    2. Diabetic mononeuropathy associated with type 2 diabetes mellitus (H)

## 2021-06-16 NOTE — PROGRESS NOTES
Assessment: Adam has had type 2 diabetes since 2002.  He is currently taking 1000 mg Metformin once daily.   He reports when he was first diagnosed, he made significant changes to his eating and did lose 70 lbs over the course of 10 months in 2002 and at that time was taken off Metformin.   His Alc in 2013 was 5.8%, 11/2017 13.1%.   Since November, he has made significant changes to his eating, smaller portions, no pop, more meals at home.  He is avoid carbs.  He lives with spouse and 3 boys - he has stressful job and finding it difficult to find time to take care of himself.  He checks BG on occasion, but avoids it because he doesn't like the higher numbers,  reports FBS can be 140-160's mg/dl.    He recently hurt his back, so activity is limited at this time, but has plan to return to gym-  has [employer] membership.    Plan: Reviewed nutrition guidelines and healthy range for carbohydrates.  He did agree to check BG more often:  recommended FBS and one other 2 hour post meal each day.   I will consult with Dr. Pompa to see about increasing metformin to 1000 bid and to consider adding a GLP-1 medication.      Subjective and Objective:      Adam Arce is referred by Dr. Pompa for Diabetes Education.     Lab Results   Component Value Date    HGBA1C 8.0 (H) 02/26/2018         Goals       monitor            Adam agreed to check FBS and one other 2 hour post meal reading- daily            Follow up:   Primary care visit      Education:     Monitoring   Meter (per above goals): assessment, discussed, pt returned demonstration,  Monitoring: assessment, discussed, pt returned demonstration, literature provided   BG goals: assessment, discussed, pt returned demonstration, literature provided      Nutrition Management:  assessment, discussed, pt returned demo,  literature provided   Weight:assessment, discussed, pt returned demo,  literature provided   Portions/Balance: assessment, discussed, pt returned  demo,  literature provided   Carb ID/Count: assessment, discussed, pt returned demo,  literature provided   Label Reading: assessment, discussed, pt returned demo,  literature provided   Heart Healthy Fats:assessment, discussed, pt returned demo,  literature provided   Menu Planning: assessment, discussed, pt returned demo,  literature provided   Dining Out: assessment, discussed,  literature provided   Physical Activity: assessment, discussed,  literature provided   Medications: assessment, discussed  Orals: assessment, discussed  Injected Medications: Discussed   Diabetes Disease Process: Discussed    Acute Complications: Prevent, Detect, Treat:  Hypoglycemia: Discussed and Literature provided  Hyperglycemia: Literature provided  Sick Days: Literature provided    Chronic Complications  Foot Care: literature provided  Skin Care: Literature provided  Eye: Literature provided  ABC: Literature provided  Teeth:Literature provided  Goal Setting and Problem Solving: Assessed and Discussed  Barriers: Assessed and Discussed  Psychosocial Adjustments: Discussed      Time spent with the patient: 45 minutes for diabetes education and counseling.   Previous Education: yes  Visit Type:BRYAN Sierra  3/2/2018

## 2021-06-16 NOTE — PROGRESS NOTES
Preoperative Exam    Scheduled Procedure: Circumcision  Surgery Date:  3/16/18  Surgery Location: [facility]    Surgeon:  Dr. Lantigua    Assessment/Plan:     1. Encounter for preoperative examination for general surgical procedure    - HM1(CBC and Differential)  - Basic Metabolic Panel  - HM1 (CBC with Diff)    2. Phimosis       3. Type 2 diabetes mellitus without complication, without long-term current use of insulin    - Glycosylated Hemoglobin A1c  - Ambulatory referral to Diabetic Education Program  Refill  - metFORMIN (GLUCOPHAGE XR) 500 MG 24 hr tablet; Take 2 tablets (1,000 mg total) by mouth daily with breakfast.  Dispense: 180 tablet; Refill: 0        Surgical Procedure Risk: Low (reported cardiac risk generally < 1%)  Have you had prior anesthesia?: Yes  Have you or any family members had a previous anesthesia reaction:  No  Do you or any family members have a history of a clotting or bleeding disorder?: No  Cardiac Risk Assessment: no increased risk for major cardiac complications    Patient approved for surgery with general or local anesthesia.        Functional Status: Independent  Patient plans to recover at home with family.     Subjective:      Adam Arce is a 37 y.o. male who presents for a preoperative consultation, for the aforementioned procedure.  He ended up with having an issue with his foreskin, unable to retract last November, and recommended to have Circumcision.     ROS:  DM:   Exercising   BG: between 130-160.   All other systems reviewed and are negative, other than those listed in the HPI.    Pertinent History  Do you have difficulty breathing or chest pain after walking up a flight of stairs: No  History of obstructive sleep apnea: No  Steroid use in the last 6 months: No  Frequent Aspirin/NSAID use: No  Prior Blood Transfusion: No  Prior Blood Transfusion Reaction: No  If for some reason prior to, during or after the procedure, if it is medically indicated, would you be willing to  "have a blood transfusion?:  There is no transfusion refusal.    Current Outpatient Prescriptions   Medication Sig Dispense Refill     blood glucose meter (GLUCOMETER) Use 1 each As Directed as needed. Dispense glucometer brand per patient's insurance at pharmacy discretion. 1 each 0     blood glucose test strips Use 1 each As Directed daily as needed. Dispense brand per patient's insurance at pharmacy discretion. 100 strip 3     generic lancets (FINGERSTIX LANCETS) Use 1 each As Directed daily as needed. Dispense brand per patient's insurance at pharmacy discretion. 100 each 3     metFORMIN (GLUCOPHAGE XR) 500 MG 24 hr tablet Take 2 tablets (1,000 mg total) by mouth daily with breakfast. 60 tablet 0     No current facility-administered medications for this visit.         No Known Allergies    Patient Active Problem List   Diagnosis     Overweight     Type 2 diabetes mellitus without complication     Hyperlipidemia       Past Medical History:   Diagnosis Date     Diet-controlled diabetes mellitus           Overweight     Created by Conversion        Past Surgical History:   Procedure Laterality Date     ORCHIOPEXY       VASECTOMY         Social History     Social History     Marital status:      Spouse name: N/A     Number of children: N/A     Years of education: N/A     Occupational History     Not on file.     Social History Main Topics     Smoking status: Never Smoker     Smokeless tobacco: Never Used     Alcohol use Yes      Comment: socially      Drug use: No     Sexual activity: Yes     Partners: Female      Comment: 3 children      Other Topics Concern     Not on file     Social History Narrative             Objective:     Vitals:    02/26/18 1730   BP: 136/82   Pulse: (!) 55   Temp: 97.5  F (36.4  C)   TempSrc: Oral   SpO2: 98%   Weight: (!) 253 lb (114.8 kg)   Height: 6' 1.5\" (1.867 m)         Physical Exam:  General Appearance:    Alert, cooperative, no distress,    Eyes:    PERRL, " conjunctiva/corneas clear,    Throat:   Lips, mucosa, and tongue normal; teeth and gums normal   Neck:   Supple, symmetrical, trachea midline, no adenopathy;        thyroid:  No enlargement/tenderness/nodules; no carotid    bruit or JVD   Lungs:     Clear to auscultation bilaterally, respirations unlabored   Heart:    Regular rate and rhythm, S1 and S2 normal, no murmur, rub   or gallop   Abdomen:     Soft, non-tender, normal bowel sounds, no rebound or guarding, no masses, no organomegaly   Extremities:   Extremities normal, atraumatic, no cyanosis or edema   Skin:   Skin color, texture, turgor normal, no rashes or lesions        There are no Patient Instructions on file for this visit.    EKG:  N/A.     Labs:  Recent Results (from the past 48 hour(s))   Glycosylated Hemoglobin A1c    Collection Time: 02/26/18  5:55 PM   Result Value Ref Range    Hemoglobin A1c 8.0 (H) 3.5 - 6.0 %   Basic Metabolic Panel    Collection Time: 02/26/18  5:55 PM   Result Value Ref Range    Sodium 136 136 - 145 mmol/L    Potassium 4.0 3.5 - 5.0 mmol/L    Chloride 100 98 - 107 mmol/L    CO2 26 22 - 31 mmol/L    Anion Gap, Calculation 10 5 - 18 mmol/L    Glucose 125 70 - 125 mg/dL    Calcium 10.2 8.5 - 10.5 mg/dL    BUN 15 8 - 22 mg/dL    Creatinine 0.80 0.70 - 1.30 mg/dL    GFR MDRD Af Amer >60 >60 mL/min/1.73m2    GFR MDRD Non Af Amer >60 >60 mL/min/1.73m2   HM1 (CBC with Diff)    Collection Time: 02/26/18  5:55 PM   Result Value Ref Range    WBC 7.3 4.0 - 11.0 thou/uL    RBC 5.29 4.40 - 6.20 mill/uL    Hemoglobin 15.0 14.0 - 18.0 g/dL    Hematocrit 43.6 40.0 - 54.0 %    MCV 82 80 - 100 fL    MCH 28.4 27.0 - 34.0 pg    MCHC 34.4 32.0 - 36.0 g/dL    RDW 12.1 11.0 - 14.5 %    Platelets 183 140 - 440 thou/uL    MPV 8.6 7.0 - 10.0 fL    Neutrophils % 57 50 - 70 %    Lymphocytes % 35 20 - 40 %    Monocytes % 6 2 - 10 %    Eosinophils % 1 0 - 6 %    Basophils % 2 0 - 2 %    Neutrophils Absolute 4.1 2.0 - 7.7 thou/uL    Lymphocytes Absolute  2.5 0.8 - 4.4 thou/uL    Monocytes Absolute 0.4 0.0 - 0.9 thou/uL    Eosinophils Absolute 0.1 0.0 - 0.4 thou/uL    Basophils Absolute 0.1 0.0 - 0.2 thou/uL        Immunization History   Administered Date(s) Administered     Influenza, seasonal,quad inj 6-35 mos 09/14/2012     Influenza,seasonal quad, PF, 36+MOS 11/29/2017     MMR 04/28/1994, 09/01/1994     Td, Adult, Absorbed 04/28/1998     Tdap 09/14/2012           Electronically signed by Tong You MD 02/26/18 5:33 PM

## 2021-06-16 NOTE — PROGRESS NOTES
Family Medicine Office Visit  [facility]  Patient Name: Adam Arce  Patient Age: 37 y.o.  YOB: 1980  MRN: [id number]    Date of Visit: 3/1/2018  Reason for Office Visit:   Chief Complaint   Patient presents with     Back Pain     Threw out lower back on Monday this week while putting on his socks. Has been a dull ache until last night after work. He is having issues with walking and laying down. Had slept on the floor last night. Nsaids are not helping with the pain.            Assessment / Plan / Medical Decision Makin. Acute bilateral low back pain without sciatica  - Sent in script for steroid, pain medication and muscle relaxer.  Discussed steroid use with pt and his dx of DM - continue to monitor sugars and increase water intake.  If any sugar readings that significantly high, call the office or go to the ER immediately  - ketorolac injection 60 mg (TORADOL); Infuse 2 mL (60 mg total) into a venous catheter once.    2. Lumbar strain, initial encounter  - given pt education for recovery        Health Maintenance Review  Health Maintenance   Topic Date Due     DIABETES FOLLOW-UP  1980     DIABETES FOOT EXAM  1992     DIABETES OPHTHALMOLOGY EXAM  1992     ADVANCE DIRECTIVES DISCUSSED WITH PATIENT  2000     DIABETES HEMOGLOBIN A1C  2018     DIABETES URINE MICROALBUMIN  2018     TD 18+ HE  2022     INFLUENZA VACCINE RULE BASED  Completed     TDAP ADULT ONE TIME DOSE  Completed         I am having Mr. Arce start on HYDROcodone-acetaminophen, methylPREDNISolone, and cyclobenzaprine. I am also having him maintain his blood glucose meter, blood glucose test, generic lancets, and metFORMIN. We will continue to administer ketorolac.      HPI:  Adam Arce is a 37 y.o. year old who presents to the office today for back pain - started on Monday when bending down while sitting on the bed to put on socks.  Immediate  pain.  Taking NSAIDS and slowly getting better until yesterday when got home from work and then had to sleep on the floor due to the pain.  Pain located in the middle of the back, non-radiating, no pain down legs, no change in bowel or urinary habits.  Pain is 4-5/10 with sitting but 10/10 with movements including walking, standing, twisting.  No hx of similar back episodes.  No numbness or tingling.      Review of Systems- pertinent positive in bold:  Constitutional: Fever, chills, night sweats, fainting, weight change, fatigue, seizures, dizziness, sleeping difficulties, loud snoring/pauses in breathing  Eyes: change in vision, blurred or double vision, redness/eye pain  Ears, nose, mouth, throat: change in hearing, ear pain, hoarseness, difficulty swallowing, sores in the mouth or throat  Respiratory: shortness of breath, cough, bloody sputum, wheezing  Cardiovascular: chest pain, palpitations   Gastrointestinal: abdominal pain, heartburn/indigestion, nausea/vomiting, change in appetite, change in bowel habits, constipation or diarrhea, rectal bleeding/dark stools, difficulty swallowing  Urinary: painful urination, frequent urination, urinary urgency/incontinence, blood in urine/dark urine, nocturia  Musculoskeletal: backache/back pain (new or increasing), weakness, joint pain/stiffness (new or increasing), muscle cramps, swelling of hands, feet, ankles, leg pain/redness  Skin: change in moles/freckles, rash, nodules  Hematologic/lymphatic: swollen lymph glands, abnormal bruising/bleeding  Endocrine: excessive thirst/urination, cold or heat intolerance  Neurologic/emotional: worrisome memory change, numbness/tingling, anxiety, mood swings      Current Scheduled Meds:  Outpatient Encounter Prescriptions as of 3/1/2018   Medication Sig Dispense Refill     blood glucose meter (GLUCOMETER) Use 1 each As Directed as needed. Dispense glucometer brand per patient's insurance at pharmacy discretion. 1 each 0     blood  "glucose test strips Use 1 each As Directed daily as needed. Dispense brand per patient's insurance at pharmacy discretion. 100 strip 3     generic lancets (FINGERSTIX LANCETS) Use 1 each As Directed daily as needed. Dispense brand per patient's insurance at pharmacy discretion. 100 each 3     metFORMIN (GLUCOPHAGE XR) 500 MG 24 hr tablet Take 2 tablets (1,000 mg total) by mouth daily with breakfast. 180 tablet 0     cyclobenzaprine (FLEXERIL) 5 MG tablet Take 1 tablet (5 mg total) by mouth 3 (three) times a day as needed for muscle spasms. 30 tablet 0     HYDROcodone-acetaminophen 5-325 mg per tablet Take 1 tablet by mouth every 6 (six) hours as needed for pain. 12 tablet 0     methylPREDNISolone (MEDROL DOSEPACK) 4 mg tablet follow package directions 1 tablet 0     Facility-Administered Encounter Medications as of 3/1/2018   Medication Dose Route Frequency Provider Last Rate Last Dose     ketorolac injection 60 mg (TORADOL)  60 mg Intravenous Once Alice Briscoe MD         Past Medical History:   Diagnosis Date     Diet-controlled diabetes mellitus           Overweight     Created by Conversion      Past Surgical History:   Procedure Laterality Date     ORCHIOPEXY       VASECTOMY       Social History   Substance Use Topics     Smoking status: Never Smoker     Smokeless tobacco: Never Used     Alcohol use Yes      Comment: socially        Objective / Physical Examination:  Vitals:    03/01/18 1207   BP: 120/90   Pulse: 72   Weight: (!) 256 lb (116.1 kg)   Height: 6' 1.5\" (1.867 m)     Wt Readings from Last 3 Encounters:   03/01/18 (!) 256 lb (116.1 kg)   02/26/18 (!) 253 lb (114.8 kg)   12/06/17 (!) 252 lb 12.8 oz (114.7 kg)     BP Readings from Last 3 Encounters:   03/01/18 120/90   02/26/18 136/82   12/06/17 124/84     Body mass index is 33.32 kg/(m^2).     General Appearance: Alert and oriented, cooperative, affect appropriate, speech clear, distress due to pain  Head: Normocephalic, atraumatic  Ears: Tympanic " membrane clear with landmarks well visualized bilaterally  Eyes: PERRL, fundi appear clear bilaterally. EOMI. Conjunctivae clear and sclerae non-icteric  Nose: Septum midline, nares patent, no visible polyps, mucosa moist and without drainage  Throat: Lips and mucosa moist. Teeth in good repair, pharynx without erythema or exudate  Neck: Supple, trachea midline. No cervical adenopathy  Back: Symmetrical, tender to palpation of the bilateral lower back, no CVA tendernes, able to do STL and lift leg but painful  Lungs: Clear to auscultation bilaterally. Normal inspiratory and expiratory effort  Cardiovascular: Regular rate, normal S1, S2. No murmurs, rubs, or gallops  Abdomen: Bowel sounds active all four quadrants. Soft, non-tender. No hepatomegaly or splenomegaly. No bruits detected.   Extremities: Pulses 2+ and equal throughout. No edema. Strength equal throughout. Normal Strength 5/5 lower extremities  Integumentary: Warm and dry. Without suspicious looking lesions  Neuro: Alert and oriented, follows commands appropriately.     No orders of the defined types were placed in this encounter.  Followup: No Follow-up on file. earlier if needed.         Alice Briscoe MD

## 2021-06-17 NOTE — PATIENT INSTRUCTIONS - HE
Patient Instructions by Milena Mckeon MD at 7/12/2019  7:00 AM     Author: Milena Mckeon MD Service: -- Author Type: Physician    Filed: 7/12/2019  7:30 AM Encounter Date: 7/12/2019 Status: Signed    : Milena Mckeon MD (Physician)         Patient Education     Diabetes: Keeping Feet Healthy     Have your feet checked every time you see your healthcare provider, and at least once a year.     Diabetes can damage nerves in your feet and cause neuropathy. This condition makes it hard for you to feel injuries or sore spots. Diabetes can also change blood flow, making it harder for small problems, like a blister, to heal properly. In fact, minor injuries can quickly become serious infections that send you to the hospital. Practice self-care to protect your feet and keep them healthy.   Take special care  Here are tips for taking special care of your feet:    Inspect your feet daily for problems such as redness, blisters, cracks, dry skin, or numbness. Use a mirror to see the bottoms of your feet. Or, ask for help.    Manage your diabetes. Monitor and control your blood sugar. Take all your medicines as prescribed.    Avoid walking barefoot, even indoors. Always wear socks inside your shoes.     Wash your feet with warm water and mild soap. Dry well, especially between toes.    Dont treat corns or calluses yourself. Talk to your healthcare provider or podiatrist (a healthcare provider who specializes in foot care) if you need assistance trimming your toenails.    Use moisturizing cream or lotion if you have dry skin, but dont use it between toes.    Dont use heating pads on your feet. If you have neuropathy, you could get a burn and not feel it.    Stop smoking. Smoking restricts blood flow and can make it harder for wounds to heal.    Don't use sharp blades to trim your nails. Use a nail clipper and file instead.   Have regular checkups  Foot problems can develop quickly. So be sure to follow your  healthcare teams schedule for regular checkups. During office visits, take off your shoes and socks as soon as you get in the exam room. Ask your healthcare provider to examine your feet for problems. This will make it easier to find and treat small skin irritations before they get worse. Regular checkups can also help keep track of the blood flow and feeling in your feet. If you have neuropathy, you may need to have checkups more often.  Wear proper footwear  Wearing proper footwear is very important. If areas of your feet have been damaged by too much pressure, your healthcare provider may recommend changing your footwear. In some cases, avoiding high heels or tight work boots may be all thats needed. Or, your healthcare provider may recommend special shoes or custom inserts. These help protect your feet and keep existing irritations from getting worse. If you need special footwear, ask your healthcare provider if you qualify for Medicare's custom-molded and extra-depth diabetic shoe and insert program.   Make sure shoes and socks fit  Any pair of shoes--new or old--should feel comfortable as soon as you put them on. There shouldnt be any rubbing when you walk. Wear the right shoe for any activity. For instance, a running shoe is designed to keep your feet injury-free while jogging. Buy shoes at the end of the day, when your feet are larger. Make sure they provide support without feeling too loose. Make sure your socks fit, too. Wear soft, seamless, well-padded socks for activity. Cotton or microfiber socks are best to help to absorb sweat. To protect your feet, avoid shoes that are open-toed or open-heeled. If you have questions about what kinds of shoes and socks are best, talk to your healthcare team.  Get regular exercise  Regular exercise improves blood flow in your feet. It also increases foot strength and flexibility. Gentle exercises, like walking or riding a stationary bicycle, are best. You can also do  special foot exercises. Just be sure to talk with your healthcare provider before starting any exercise program. Also mention if any exercise causes pain, redness, or other signs of foot problems.     Note: If you have any kind of break in the skin of your foot or ankle, keep the area clean. Then call your healthcare provider--especially if the area doesnt appear to be healing.   Date Last Reviewed: 6/1/2016 2000-2017 [employer]. [address] [place] [address], [place] [id number]. All rights reserved. This information is not intended as a substitute for professional medical care. Always follow your healthcare professional's instructions.           Patient Education     Diabetic Foot Care  Diabetes can lead to a number of foot complications. Fortunately, you can prevent most of these with a little extra foot care. If diabetes is not well controlled, it can cause damage to blood vessels and result in poor circulation to the foot. When the skin does not get enough blood flow, it becomes prone to pressure sores and ulcers, which heal slowly.  Diabetes can also damage nerves, interfering with the ability to feel pain and pressure. When you cant feel your foot normally, it is easy to injure your skin, bones, and joints without knowing it. For these reasons diabetes increases the risk of fungal infections, bunions, and ulcers. An ulcer is a sore or break in the skin. With ulcers, often the skin seems to have worn away. Deep ulcers can lead to bone infection.  Gangrene is the most serious foot complication of diabetes. It usually occurs on the tips of the toes as blackened areas of skin. The black area is dead tissue. In severe cases, gangrene spreads to involve the entire toe, other toes, and the entire foot. Foot or toe amputation may be required. Good foot care and blood sugar control can prevent this.  Home care    Wear comfortable, well-fitting shoes.    Wash your feet daily with warm water and mild  soap.    After drying, apply a moisturizing cream or lotion to the top and bottom of your feet. Don't put lotion between toes.    Check your feet daily for skin breaks, blisters, swelling, or redness. Look between your toes as well. If you cannot see the bottoms of your feet, ask someone to look or use a mirror.    Wear cotton socks and change them every day.    Trim toenails carefully, and do not cut your cuticles.    Strive to keep your blood sugar under control with a combination of medicines, diet, and activity.    If you smoke and have diabetes, it is very important that you stop. Smoking reduces blood flow to your foot.    Schedule foot exams at least every year, or more often if you have foot problems.    Put your feet up when sitting, wiggle toes, and move ankles to help improve blood flow.  Avoid activities that increase your risk of foot injury:    Do not walk barefoot.    Do not use heating pads or hot water bottles on your feet.    Do not put your foot in a hot tub without first checking the temperature with your hand.  Follow-up care  Follow up with your healthcare provider, or as advised. Be sure to take off your shoes and socks before your appointment starts so your healthcare provider will be sure to check your feet. Report any cut, puncture, scrape, blister, or other injury to your foot. Also report if you have a bunion, hammertoes, ingrown toenail, or ulcer on your foot.  When to seek medical advice  Call your healthcare provider right away if any of these occur:    Black skin color anywhere on the foot    Open ulcer with pus draining from the wound    Increasing foot or leg pain    New areas of redness or swelling or tender areas of the foot    Fever of 100.4 F (38 C) or greater  Date Last Reviewed: 5/25/2016 2000-2017 [employer]. [address] [place] [address], [place] [address]. All rights reserved. This information is not intended as a substitute for professional medical care. Always  follow your healthcare professional's instructions.

## 2021-06-19 NOTE — LETTER
Letter by Tong You MD at      Author: Tong You MD Service: -- Author Type: --    Filed:  Encounter Date: 8/28/2019 Status: (Other)         Adam Arce  [address] [facility] [address]             August 28, 2019         Dear Mr. Wilburnon,    Below are the results from your recent visit:    Resulted Orders   Glycosylated Hemoglobin A1c   Result Value Ref Range    Hemoglobin A1c 7.6 (H) 3.5 - 6.0 %              Significant improvement in A1c   Continue current management   Follow-up in 3 months          Please call with questions or contact us using [employer].    Sincerely,        Electronically signed by Tong You MD

## 2021-06-19 NOTE — LETTER
Letter by Tong You MD at      Author: Tong You MD Service: -- Author Type: --    Filed:  Encounter Date: 7/18/2019 Status: (Other)         Adam Arce  [address] [facility] [address]             July 18, 2019         Dear Mr. Arce,    Below are the results from your recent visit:    Resulted Orders   Glycosylated Hemoglobin A1c   Result Value Ref Range    Hemoglobin A1c 11.6 (H) 3.5 - 6.0 %   Microalbumin, Random Urine   Result Value Ref Range    Microalbumin, Random Urine 0.94 0.00 - 1.99 mg/dL    Creatinine, Urine 66.2 mg/dL    Microalbumin/Creatinine Ratio Random Urine 14.2 <=19.9 mg/g    Narrative    Microalbumin, Random Urine  <2.0 mg/dL . . . . . . . . Normal  3.0-30.0 mg/dL . . . . . . Microalbuminuria  >30.0 mg/dL . . . . . .  . Clinical Proteinuria    Microalbumin/Creatinine Ratio, Random Urine  <20 mg/g . . . . .. . . . Normal   mg/g . . . . . . . Microalbuminuria  >300 mg/g . . . . . . . . Clinical Proteinuria       Lipid Profile   Result Value Ref Range    Triglycerides 368 (H) <=149 mg/dL    Cholesterol 243 (H) <=199 mg/dL    LDL Calculated 129 <=129 mg/dL    HDL Cholesterol 40 >=40 mg/dL    Patient Fasting > 8hrs? No    Comprehensive Metabolic Panel   Result Value Ref Range    Sodium 138 136 - 145 mmol/L    Potassium 4.3 3.5 - 5.0 mmol/L    Chloride 103 98 - 107 mmol/L    CO2 26 22 - 31 mmol/L    Anion Gap, Calculation 9 5 - 18 mmol/L    Glucose 169 (H) 70 - 125 mg/dL    BUN 15 8 - 22 mg/dL    Creatinine 1.05 0.70 - 1.30 mg/dL    GFR MDRD Af Amer >60 >60 mL/min/1.73m2    GFR MDRD Non Af Amer >60 >60 mL/min/1.73m2    Bilirubin, Total 0.6 0.0 - 1.0 mg/dL    Calcium 10.1 8.5 - 10.5 mg/dL    Protein, Total 8.0 6.0 - 8.0 g/dL    Albumin 4.3 3.5 - 5.0 g/dL    Alkaline Phosphatase 98 45 - 120 U/L    AST 50 (H) 0 - 40 U/L    ALT 87 (H) 0 - 45 U/L    Narrative    Fasting Glucose reference range is 70-99 mg/dL per  American Diabetes Association (ADA) guidelines.           Mildly elevated liver enzymes and elevated blood sugar   Management is aimed at better diabetic control cutting back on carbs and fats   Will monitor level   Follow-up in 3 months     Elevated HgA1c    Follow current plan     Elevated triglycerides and cholesterol   Make an attempt to improve diet, cut back on the carbs and fats,  and exercise more efficiently  to aid in medical management of this problem.     Recheck in 3 months     Other labs normal          Please call with questions or contact us using [employer].    Sincerely,        Electronically signed by Tong You MD

## 2021-06-20 NOTE — LETTER
Letter by Shelby Samaniego PA-C at      Author: Shelby Samaniego PA-C Service: -- Author Type: --    Filed:  Encounter Date: 4/17/2020 Status: (Other)         Adam Arce  [address] [facility] [address]             April 17, 2020         Dear Mr. Arce,    Below are the results from your recent visit:    Resulted Orders   Urinalysis-UC if Indicated   Result Value Ref Range    Color, UA Yellow Colorless, Yellow, Straw, Light Yellow    Clarity, UA Clear Clear    Glucose, UA Negative Negative    Bilirubin, UA Negative Negative    Ketones, UA Negative Negative    Specific Gravity, UA >=1.030 1.005 - 1.030    Blood, UA Negative Negative    pH, UA 5.5 5.0 - 8.0    Protein, UA Negative Negative mg/dL    Urobilinogen, UA 0.2 E.U./dL 0.2 E.U./dL, 1.0 E.U./dL    Nitrite, UA Negative Negative    Leukocytes, UA Negative Negative    Bacteria, UA None Seen None Seen hpf    RBC, UA None Seen None Seen, 0-2 hpf    WBC, UA None Seen None Seen, 0-5 hpf    Squam Epithel, UA None Seen None Seen, 0-5 lpf    Narrative    UC not indicated        No signs of urinary tract infection    Please call with questions or contact us using [employer].    Sincerely,        Electronically signed by Shelby Samaniego PA-C

## 2021-06-20 NOTE — LETTER
Letter by Tong You MD at      Author: Tong You MD Service: -- Author Type: --    Filed:  Encounter Date: 7/10/2020 Status: (Other)         Adam Arce  [address] [facility] [address]             July 10, 2020         Dear Mr. Arce,    Below are the results from your recent visit:    Resulted Orders   Glycosylated Hemoglobin A1c   Result Value Ref Range    Hemoglobin A1c 5.8 3.5 - 6.0 %        Normalized level    Continue current management     Please call with questions or contact us using [employer].    Sincerely,        Electronically signed by Tong You MD

## 2021-06-20 NOTE — LETTER
Letter by Shelby Samaniego PA-C at      Author: Shelby Samaniego PA-C Service: -- Author Type: --    Filed:  Encounter Date: 4/17/2020 Status: (Other)         Adam Arce  [address] [facility] [address]             April 17, 2020         Dear Mr. Arce,    Below are the results from your recent visit:    Resulted Orders   XR Lumbar Spine W Flex and Ext 6 or More VWS    Narrative    EXAM DATE:         04/17/2020    EXAM: X-RAY LUMBAR SPINE, AP AND LATERAL  LOCATION: [facility]  DATE/TIME: 4/17/2020 11:30 AM    INDICATION: Frequency of micturition. Pain.  COMPARISON: None.    IMPRESSION:  Five nonrib-bearing lumbar-type vertebral bodies. Reversal of the usual lumbar  lordosis without subluxation. Normal vertebral body heights. No fracture. Mild  interspace and endplate degeneration.                  Loss of normal lumbar curve and some endplate arthritis seen on some of the vertebral bodies.  The disc areas look normal.    Please call with questions or contact us using [employer].    Sincerely,        Electronically signed by Shelby Smaaniego PA-C

## 2021-06-20 NOTE — LETTER
Letter by Tong You MD at      Author: Tong Yuo MD Service: -- Author Type: --    Filed:  Encounter Date: 8/13/2020 Status: (Other)       Adam Arce  [address] [facility] [address]      08/17/20      Dear Adam,      In reviewing your records, we have determined a medication check is needed before your next refill, please call the [facility] to schedule an appointment.      Medication check/review      We have made attempts to call you for an appointment, please verify your contact information is correct when calling back for an appointment, or if you have transferred your care to another clinic, please contact us so we can update our records.     Please call [phone] to schedule an appointment.    We believe that a strong preventative care program, including regular physicals and follow-up care is an important part of a healthy lifestyle and we are committed to helping you maintain your health.    Thank you for choosing us as your health care provider.    Sincerely,    Mai Burgos CMA - SADE/CA  [facility]  [address] [address]  [phone]

## 2021-06-20 NOTE — LETTER
Letter by Shelby Samanieog PA-C at      Author: Shelby Samaniego PA-C Service: -- Author Type: --    Filed:  Encounter Date: 4/17/2020 Status: (Other)         Adam Arce  [address] [facility] [address]             April 17, 2020         Dear Mr. Arce,    Below are the results from your recent visit:    Resulted Orders   XR Sacrum and Coccyx 2 or More VWS    Narrative    EXAM DATE:         04/17/2020    EXAM: X-RAY SACRUM AND COCCYX  LOCATION: [facility]  DATE/TIME: 4/17/2020 11:45 AM    INDICATION: Frequency of micturition Pain  COMPARISON: None.    IMPRESSION: No evidence of a sacrococcygeal fracture. SI joint spaces are  maintained. Small left acetabular ossicle.                  No sacral area concerns    Please call with questions or contact us using "[employer]t.    Sincerely,        Electronically signed by Shelby Samaniego PA-C

## 2021-06-21 NOTE — LETTER
"Letter by Tong You MD at      Author: Tong You MD Service: -- Author Type: --    Filed:  Encounter Date: 2/22/2021 Status: (Other)         Adam Arce  [address] [facility] [address]             February 22, 2021         Dear Mr. Arce,    Below are the results from your recent visit:     Elevated Triglycerides and cholesterol    Recommendation:    Make an attempt to improve diet, cut back on the carbs and fats,  and exercise more efficiently      Recheck in a year     Resulted Orders   Glycosylated Hemoglobin A1c   Result Value Ref Range    Hemoglobin A1c 6.4 (H) <=5.6 %   Microalbumin, Random Urine   Result Value Ref Range    Microalbumin, Random Urine <0.50 0.00 - 1.99 mg/dL    Creatinine, Urine 72.9 mg/dL    Microalbumin/Creatinine Ratio Random Urine        Comment:      \"Unable to calculate: Creatinine and/or Microalbumin value below detectable level\"    Narrative    Microalbumin, Random Urine  <2.0 mg/dL . . . . . . . . Normal  3.0-30.0 mg/dL . . . . . . Microalbuminuria  >30.0 mg/dL . . . . . .  . Clinical Proteinuria    Microalbumin/Creatinine Ratio, Random Urine  <20 mg/g . . . . .. . . . Normal   mg/g . . . . . . . Microalbuminuria  >300 mg/g . . . . . . . . Clinical Proteinuria       Lipid Profile   Result Value Ref Range    Triglycerides 284 (H) <=149 mg/dL    Cholesterol 225 (H) <=199 mg/dL    LDL Calculated 126 <=129 mg/dL    HDL Cholesterol 42 >=40 mg/dL    Patient Fasting > 8hrs? No          Please call with questions or contact us using [employer].    Sincerely,        Electronically signed by Tong You MD       "

## 2021-06-21 NOTE — LETTER
"Letter by Tong You MD at      Author: Tong You MD Service: -- Author Type: --    Filed:  Encounter Date: 2/22/2021 Status: (Other)         Adam Arce  [address] [facility] [address]             February 22, 2021         Dear Mr. Arce,    Below are the results from your recent visit:   Stable HgA1c    Neg microalbumin    Recheck HgA1c in 6-12 months    Resulted Orders   Glycosylated Hemoglobin A1c   Result Value Ref Range    Hemoglobin A1c 6.4 (H) <=5.6 %   Microalbumin, Random Urine   Result Value Ref Range    Microalbumin, Random Urine <0.50 0.00 - 1.99 mg/dL    Creatinine, Urine 72.9 mg/dL    Microalbumin/Creatinine Ratio Random Urine        Comment:      \"Unable to calculate: Creatinine and/or Microalbumin value below detectable level\"    Narrative    Microalbumin, Random Urine  <2.0 mg/dL . . . . . . . . Normal  3.0-30.0 mg/dL . . . . . . Microalbuminuria  >30.0 mg/dL . . . . . .  . Clinical Proteinuria    Microalbumin/Creatinine Ratio, Random Urine  <20 mg/g . . . . .. . . . Normal   mg/g . . . . . . . Microalbuminuria  >300 mg/g . . . . . . . . Clinical Proteinuria       Lipid Profile   Result Value Ref Range    Triglycerides 284 (H) <=149 mg/dL    Cholesterol 225 (H) <=199 mg/dL    LDL Calculated 126 <=129 mg/dL    HDL Cholesterol 42 >=40 mg/dL    Patient Fasting > 8hrs? No          Please call with questions or contact us using [employer].    Sincerely,        Electronically signed by Tong You MD       "

## 2021-06-24 NOTE — TELEPHONE ENCOUNTER
RN cannot approve Refill Request    RN can NOT refill this medication Protocol failed and NO refill given. Last office visit: 3/1/2018 Alice Briscoe MD Last Physical: Visit date not found Last MTM visit: Visit date not found Last visit same specialty: 3/1/2018 Alice Briscoe MD.  Next visit within 3 mo: Visit date not found  Next physical within 3 mo: Visit date not found      Heidi Cheng, [facility] Connection Triage/Med Refill 2/25/2019    Requested Prescriptions   Pending Prescriptions Disp Refills     metFORMIN (GLUCOPHAGE-XR) 500 MG 24 hr tablet [Pharmacy Med Name: METFORMIN  MG TABLET] 180 tablet 2     Sig: TAKE 2 TABLETS BY MOUTH DAILY WITH BREAKFAST.    Metformin Refill Protocol Failed - 2/24/2019  8:41 AM       Failed - LFT or AST or ALT in last 12 months    No results found for: ALBUMIN, BILITOT, BILIDIR, ALKPHOS, AST, ALT, PROT            Failed - Visit with PCP or prescribing provider visit in last 6 months or next 3 months    Last office visit with prescriber/PCP: Visit date not found OR same dept: 3/1/2018 Alice Briscoe MD OR same specialty: 3/1/2018 Alice Briscoe MD Last physical: Visit date not found Last MTM visit: Visit date not found         Next appt within 3 mo: Visit date not found  Next physical within 3 mo: Visit date not found  Prescriber OR PCP: Alice Briscoe MD  Last diagnosis associated with med order: 1. Type 2 diabetes mellitus without complication, without long-term current use of insulin (H)  - metFORMIN (GLUCOPHAGE-XR) 500 MG 24 hr tablet [Pharmacy Med Name: METFORMIN  MG TABLET]; TAKE 2 TABLETS BY MOUTH DAILY WITH BREAKFAST.  Dispense: 180 tablet; Refill: 2     If protocol passes may refill for 12 months if within 3 months of last provider visit (or a total of 15 months).          Failed - A1C in last 6 months    Hemoglobin A1c   Date Value Ref Range Status   02/26/2018 8.0 (H) 3.5 - 6.0 % Final              Failed - Microalbumin in last year      Microalbumin, Random Urine   Date Value Ref Range Status   12/06/2017 0.78 0.00 - 1.99 mg/dL Final                 Passed - Blood pressure in last 12 months    BP Readings from Last 1 Encounters:   03/01/18 120/90            Passed - GFR or Serum Creatinine in last 6 months    GFR MDRD Non Af Amer   Date Value Ref Range Status   02/26/2018 >60 >60 mL/min/1.73m2 Final     GFR MDRD Af Amer   Date Value Ref Range Status   02/26/2018 >60 >60 mL/min/1.73m2 Final

## 2021-07-03 NOTE — ADDENDUM NOTE
Addendum Note by Tong You MD at 7/14/2020  2:58 PM     Author: Tong You MD Service: -- Author Type: Physician    Filed: 7/14/2020  2:58 PM Encounter Date: 7/9/2020 Status: Signed    : Tong You MD (Physician)    Addended by: TONG YOU on: 7/14/2020 02:58 PM        Modules accepted: Orders

## 2021-07-05 ENCOUNTER — COMMUNICATION - HEALTHEAST (OUTPATIENT)
Dept: FAMILY MEDICINE | Facility: CLINIC | Age: 41
End: 2021-07-05

## 2021-07-05 DIAGNOSIS — E11.3299 TYPE 2 DIABETES MELLITUS WITH MILD NONPROLIFERATIVE RETINOPATHY WITHOUT MACULAR EDEMA, WITHOUT LONG-TERM CURRENT USE OF INSULIN, UNSPECIFIED LATERALITY (H): ICD-10-CM

## 2021-07-05 NOTE — TELEPHONE ENCOUNTER
Telephone Encounter by Jerson Gonzalez, RN at 7/5/2021  3:24 PM     Author: Jerson Gonzalez, RN Service: -- Author Type: Registered Nurse    Filed: 7/5/2021  3:24 PM Encounter Date: 7/5/2021 Status: Signed    : Jerson Gonzalez, RN (Registered Nurse)       Refill Approved    Rx renewed per Medication Renewal Policy. Medication was last renewed on 2/4/21.    Jerson Gonzalez, [facility] Connection Triage/Med Refill 7/5/2021     Requested Prescriptions   Pending Prescriptions Disp Refills   ? dulaglutide (TRULICITY) 0.75 mg/0.5 mL PnIj 12 Syringe 0     Sig: Inject 0.75 mg under the skin every 7 days.       Insulin/GLP-1 Refill Protocol Passed - 7/5/2021  3:23 PM        Passed - Visit with PCP or prescribing provider visit in last 6 months     Last office visit with prescriber/PCP: 2/17/2021 OR same dept: 2/17/2021 Tong You MD OR same specialty: 2/17/2021 Tong You MD Last physical: Visit date not found Last MTM visit: Visit date not found     Next appt within 3 mo: Visit date not found  Next physical within 3 mo: Visit date not found  Prescriber OR PCP: Tong You MD  Last diagnosis associated with med order: 1. Type 2 diabetes mellitus with mild nonproliferative retinopathy without macular edema, without long-term current use of insulin, unspecified laterality (H)  - dulaglutide (TRULICITY) 0.75 mg/0.5 mL PnIj; Inject 0.75 mg under the skin every 7 days.  Dispense: 12 Syringe; Refill: 0    If protocol passes may refill for 6 months if within 3 months of last provider visit (or a total of 9 months).              Passed - A1C in last 6 months     Hemoglobin A1c   Date Value Ref Range Status   02/17/2021 6.4 (H) <=5.6 % Final               Passed - Microalbumin in last year     Microalbumin, Random Urine   Date Value Ref Range Status   02/17/2021 <0.50 0.00 - 1.99 mg/dL Final                  Passed - Blood pressure in last year     BP Readings from Last 1 Encounters:   02/17/21 136/84              Passed - Creatinine done in last year     Creatinine   Date Value Ref Range Status   09/02/2020 1.00 0.70 - 1.30 mg/dL Final

## 2021-07-05 NOTE — TELEPHONE ENCOUNTER
Telephone Encounter by Roz Catherine CMA at 7/5/2021  3:21 PM     Author: Roz Catherine CMA Service: -- Author Type: Certified Medical Assistant    Filed: 7/5/2021  3:23 PM Encounter Date: 7/5/2021 Status: Signed    : Roz Catherine CMA (Certified Medical Assistant)       Refill Request: Trulicity

## 2021-08-06 DIAGNOSIS — E11.3299 TYPE 2 DIABETES MELLITUS WITH MILD NONPROLIFERATIVE RETINOPATHY WITHOUT MACULAR EDEMA, WITHOUT LONG-TERM CURRENT USE OF INSULIN, UNSPECIFIED LATERALITY (H): Primary | ICD-10-CM

## 2021-08-06 NOTE — TELEPHONE ENCOUNTER
Pending Prescriptions:                       Disp   Refills    metFORMIN (GLUCOPHAGE) 500 MG tablet                          Sig: Take 1 tablet (500 mg) by mouth 2 times daily           (with meals)    Last refill 05/17/2021

## 2021-08-07 ENCOUNTER — HEALTH MAINTENANCE LETTER (OUTPATIENT)
Age: 41
End: 2021-08-07

## 2021-08-09 NOTE — TELEPHONE ENCOUNTER
"Routing refill request to provider for review/approval because:  Patient needs to be seen because:  Patient advised to follow up in 6 months from last office visit for Diabetes recheck.     Last Written Prescription Date:  2/17/21  Last Fill Quantity: 180,  # refills: 1   Last office visit provider: 2/17/21      Requested Prescriptions   Pending Prescriptions Disp Refills     metFORMIN (GLUCOPHAGE) 500 MG tablet       Sig: Take 1 tablet (500 mg) by mouth 2 times daily (with meals)       Biguanide Agents Failed - 8/6/2021  2:24 PM        Failed - Recent (6 mo) or future (30 days) visit within the authorizing provider's specialty     Patient had office visit in the last 6 months or has a visit in the next 30 days with authorizing provider or within the authorizing provider's specialty.  See \"Patient Info\" tab in inbasket, or \"Choose Columns\" in Meds & Orders section of the refill encounter.            Passed - Patient is age 10 or older        Passed - Patient has documented A1c within the specified period of time.     If HgbA1C is 8 or greater, it needs to be on file within the past 3 months.  If less than 8, must be on file within the past 6 months.     Recent Labs   Lab Test 02/17/21  1724   A1C 6.4*             Passed - Patient's CR is NOT>1.4 OR Patient's EGFR is NOT<45 within past 12 mos.     Recent Labs   Lab Test 09/02/20  1635   GFRESTIMATED >60   GFRESTBLACK >60       Recent Labs   Lab Test 09/02/20  1635   CR 1.00             Passed - Patient does NOT have a diagnosis of CHF.        Passed - Medication is active on med list             Haritha Andrade RN 08/09/21 3:58 PM  "

## 2021-10-02 ENCOUNTER — HEALTH MAINTENANCE LETTER (OUTPATIENT)
Age: 41
End: 2021-10-02

## 2021-12-12 NOTE — LETTER
GENERAL SURGERY INITIAL VISIT/HISTORY & PHYSICAL    CHIEF COMPLAINT:    Chief Complaint   Patient presents with   • Diarrhea Adult   • Altered Mental Status   • Shortness of Breath       HISTORY OF PRESENT ILLNESS:  I was asked to see Precious Acharya at the request of Ms. Carol Ann Shen, NP for transfer of care for lower extremity wounds, evaluation for debridement.  This patient is a 79 year old female significant for but not limited to advanced multiple sclerosis, ESRD on hemodialysis, secondary hyperparathyroidism, DVT on coumadin (7/2020), and chronic respiratory failure on 3L of oxygen at baseline presenting with altered mental status, worsening diarrhea, and weakness/lethergry, found to be in septic shock requiring pressor support and admission to the ICU. Infectious disease on consult noting multiple possible sources of infection including permath, bilateral percutaneous nephrostomy tubes, multiple known wounds. Cultures pending. She is known to our service as she underwent debridement of her sacral decubitus ulcer on 9/1/2021 as well as recent request for possible diverting ostomy, however had active C difficile at the time so this was not done. Surgical consult placed this hospitalization due to worsening necrotic wounds to the bilateral lower extremities with surrounding cellulitis noted to the left lower extremity.     PAST MEDICAL HISTORY:      Multiple sclerosis (CMS/HCC)                    2/20/2013     Urinary retention                               2/20/2013     Secondary hyperparathyroidism (of renal origin) 2/20/2013     CKD (chronic kidney disease), stage III (CMS/H* 2/20/2013     Indwelling Gomez catheter calcification (CMS/H*               On home oxygen therapy                                          Comment: 2L at night    Urinary tract infection                                       Blood clot associated with vein wall inflammat*                PAST SURGICAL HISTORY:   There is no  Letter by Tong You MD at      Author: Tong You MD Service: -- Author Type: --    Filed:  Encounter Date: 1/10/2020 Status: Signed         Adam Arce  [address] [facility] [address]             January 10, 2020         Dear Reji Claude,    Below are the results from your recent visit:    Resulted Orders   Glycosylated Hemoglobin A1c   Result Value Ref Range    Hemoglobin A1c 6.6 (H) 3.5 - 6.0 %           Improving A1c down to 6.6 from 7.6 from 4 months ago.   Continue current management as is with metformin 1000 mg twice a day and Trulicity 0.75 mg as previously directed with no changes at this time.   Follow diabetes diet recheck in 6 months         Please call with questions or contact us using [facility].    Sincerely,        Electronically signed by Tong You MD        previous surgical history on file.    CURRENT MEDICATIONS:   Current Facility-Administered Medications   Medication   • VANCOMYCIN - PHARMACIST MONITORED Misc   • [START ON 12/14/2021] vancomycin (VANCOCIN) 500 mg in sodium chloride 0.9 % 100 mL IVPB   • vancomycin (VANCOCIN) 500 mg in sodium chloride 0.9 % 100 mL IVPB   • [START ON 12/13/2021] pantoprazole (PROTONIX INJECT) injection 40 mg   • sodium chloride 0.9 % flush bag 25 mL   • sodium chloride (PF) 0.9 % injection 2 mL   • NORepinephrine (LEVOPHED) 8 mg/250 mL in sodium chloride 0.9 % infusion   • sodium chloride (NORMAL SALINE) 0.9 % bolus 500 mL   • vancomycin (VANCOCIN) 50 MG/ML (compounded) solution 125 mg   • sodium chloride 0.9% infusion   • sodium chloride 0.9% infusion   • ipratropium-albuterol (DUONEB) 0.5-2.5 (3) MG/3ML nebulizer solution 3 mL   • meropenem (MERREM) 1 g in sodium chloride 0.9 % 100 mL IVPB   • sodium chloride 0.9% infusion        ALLERGIES:  ALLERGIES:   Allergen Reactions   • Bqdaudpp-Bvourmu-Kfsugb [Fluocinolone] Other (See Comments)     Chest pain and legs felt heavy   • Ciprofibrate Other (See Comments)   • Ciprofloxacin Other (See Comments)     hallucinations        SOCIAL HISTORY:    Social History     Socioeconomic History   • Marital status: /Civil Union     Spouse name: Not on file   • Number of children: Not on file   • Years of education: Not on file   • Highest education level: Not on file   Occupational History   • Not on file   Tobacco Use   • Smoking status: Never Smoker   • Smokeless tobacco: Never Used   Vaping Use   • Vaping Use: never used   Substance and Sexual Activity   • Alcohol use: No   • Drug use: No   • Sexual activity: Not on file   Other Topics Concern   • Not on file   Social History Narrative   • Not on file     Social Determinants of Health     Financial Resource Strain: Low Risk    • Social Determinants: Financial Resource Strain: None   Food Insecurity: No Food Insecurity   • Social  Determinants: Food Insecurity: Never   Transportation Needs: No Transportation Needs   • Lack of Transportation (Medical): No   • Lack of Transportation (Non-Medical): No   Physical Activity:    • Days of Exercise per Week: Not on file   • Minutes of Exercise per Session: Not on file   Stress:    • Social Determinants: Stress: Not on file   Social Connections:    • Social Determinants: Social Connections: Not on file   Intimate Partner Violence: Not At Risk   • Social Determinants: Intimate Partner Violence Past Fear: No   • Social Determinants: Intimate Partner Violence Current Fear: No       FAMILY HISTORY:    Family History   Problem Relation Age of Onset   • Cancer Father         pancreatic       REVIEW OF SYSTEMS:   GENERAL:  Denies fever, chills  LUNGS:  Baseline SOB with oxygen requirements.  CARDIAC:  Denies recent chest pain, palpitations  GASTROINTESTINAL: recent c difficile infection with worsening diarrhea over the days prior to admission  GENITOURINARY: bilateral nephrostomy tube, ESRD on hemodialysis  MUSCULOSKELETAL: bilateral lower extremity leg wounds  NEUROLOGICAL: known advanced multiple sclerosis  SKIN: see HPI. Multiple known pressure related wounds    PHYSICAL EXAM:  Vitals:    12/12/21 0700 12/12/21 0715 12/12/21 0800 12/12/21 1000   BP: 106/54 112/57 112/66 115/40   BP Location:   LUE - Left upper extremity LUE - Left upper extremity   Patient Position:   Semi-Owens's Semi-Owens's   Pulse: 64 69 76 78   Resp: (!) 38 (!) 40 (!) 28 (!) 30   Temp:   96.1 °F (35.6 °C)    TempSrc:   Axillary    SpO2: 100% 100% 100% 95%   Weight:       Height:         Tmax/24 hours:  General:  Patient is awake, alert. Does not appear to be in any distress. Does note sensation of choking on phlegm during our visit.   HEENT:  Normocephalic, atraumatic.  No scleral icterus  Respiratory: breathing is primarily non-labored during our visit however she remains on supplemental oxygen with saturation around 93%. RN to  perform oral suctioning due to patient reports of choking sensation on phlegm. No respiratory distress.   CV: No edema or cyanosis. Regular rate on monitor  Abdomen:  Soft, non-distended  Extremities: bilateral lower extremities with ischemic looking wounds. The right lower extremity necrosis is dry and non-tender without bogginess or evidence of fluctuance. The left lower extremity has eschar present with significant surrounding erythema and mild tenderness to palpation. The lateral portion of the necrosis was probed at the bedside by Dr. Roe without gross purulence noted. Significant foul odor to the wound. See photos taken in ED.  Vascular: bilateral feet are warm to the touch  Neurologic: advanced MS. Alert, converses appropriately      Laboratory Results:   Lab Results   Component Value Date    WBC 23.7 (H) 12/12/2021    HCT 30.1 (L) 12/12/2021    HGB 8.5 (L) 12/12/2021     (H) 12/12/2021    INR 1.7 12/12/2021    PTT 31 (H) 12/11/2021    BUN 77 (H) 12/12/2021    CREATININE 3.03 (H) 12/12/2021    SODIUM 139 12/12/2021    POTASSIUM 4.8 12/12/2021    CHLORIDE 103 12/12/2021    AST 34 12/12/2021    BILIRUBIN 0.2 12/12/2021    CO2 27 12/12/2021    GLUCOSE 152 (H) 12/12/2021       Imaging:   XR Chest AP or PA  Result Date: 12/11/2021  Narrative: XR CHEST AP OR PA 12/11/2021 3:31 PM HISTORY: Altered mental status. COMPARISON: 11/18/2021. TECHNIQUE:  Single, AP 75 degree semiupright view of the chest. FINDINGS: Dual right IJ central lines terminating in the superior cavoatrial junction and the right atrium. The cardiomediastinal silhouette is enlarged, but unchanged. Mild pulmonary venous congestion. Area of consolidation in the left lung base/retrocardiac area, similar to prior exam. Mild right basilar airspace opacities. No pneumothorax.   Impression: IMPRESSION: 1.  Mild pulmonary venous congestion with cardiomegaly. 2.  Chronic bibasilar atelectasis, superimposed infection not entirely excluded. I,  Attending Radiologist Newton Devine MD, have reviewed the images and report and concur with these findings interpreted by Resident Radiologist, Jean Pierre Curiel MD.     XR Chest AP or PA  Result Date: 11/18/2021  Narrative: EXAM: XR CHEST AP OR PA INDICATION: Hypoxia FINDINGS: AP portable chest 9:15 AM CVC with tip in the mid/low SVC. No pneumothorax. Moderate pulmonary venous congestion, slightly increased from 10/16/2021. Small/moderate area of increased opacity/consolidation the right lung base medially increased from 10/16/2021. Small area of pneumonitis and or atelectasis left lung base unchanged from 10/16/2021. Probable small left pleural effusion also likely unchanged.   Impression: IMPRESSION: Moderately pulmonary venous congestion, increased from 10/16/2021. Interval increase in right lower lobe infiltrate. Otherwise no significant change from 10/16/2021.     XR Tibia Fibula 2 View Bilateral  Result Date: 12/11/2021  Narrative: EXAM: XR TIBIA FIBULA 2 VW BILATERAL CLINICAL HISTORY: chronic wounds, assess for subcutaneous gas TECHNIQUE: Two-view bilateral tibia and fibula. COMPARISON: None. FINDINGS: There is no evidence of subcutaneous gas in either lower leg. No lytic, sclerotic or destructive process to suggest osteomyelitis. No fracture or dislocation.     IR PICC  Impression: Non-reportable by Radiologist.    CT CHEST ABDOMEN PELVIS W CONTRAST  Result Date: 12/11/2021  Narrative: CT CHEST ABDOMEN PELVIS W CONTRAST HISTORY: Weakness, diarrhea COMPARISON: CT abdomen pelvis 11/19/2021. Chest radiograph 8/30/2021. TECHNIQUE: Axial CT images of the chest, abdomen, and pelvis with 100 mL Omnipaque 300 IV contrast. Multiplanar reformats. Axial MIP images of the chest. FINDINGS: CHEST: Lungs/pleura:  Small right and trace left pleural effusions with extensive dependent presumed atelectasis, similar to prior. No suspicious pulmonary nodules. Left upper lobe bulla. Bonnie/mediastinum:  No lymphadenopathy. Heart:   Mild coronary calcification No pericardial effusion. Vascular:  The thoracic aorta and main pulmonary artery are normal in caliber. No large central pulmonary embolism. Mild calcific atherosclerotic disease of the aorta. Axilla/supraclavicular regions:  No axillary or supraclavicular lymphadenopathy. The visualized structures of the thoracic outlet and lower neck are unremarkable. Osseous structures/subcutaneous tissues:  Mild compression deformities of the T7 and T11 vertebral bodies. The T11 compression deformity was seen on prior study dated 11/19/2021. T7 compression deformity is suspected to be present on prior radiograph dated 8/30/2021. Mild degenerative changes. ABDOMEN: Liver:  Normal parenchymal attenuation. No focal hepatic lesions. No intrahepatic biliary ductal dilatation.  Biliary system:  No gallbladder wall thickening, stones, or pericholecystic fluid. Common bile duct is normal in diameter. Spleen:  Unremarkable. Pancreas:  Normal parenchymal attenuation. No peripancreatic fluid or stranding. No pancreatic ductal dilatation. Adrenal glands:  Unremarkable. Kidneys:  Bilateral renal atrophy, more prominent on the right. Bilateral percutaneous nephrostomy tubes in unchanged position. No hydronephrosis. No nephrolithiasis. Bowel: Unremarkable distal esophagus. Gastrostomy tube within the gastric lumen. Otherwise unremarkable stomach. The small bowel is normal in caliber with no abnormal wall thickening. The colon is unremarkable. The appendix is not confidently identified, likely surgically absent. No inflammatory changes in the region of the cecum to suggest appendicitis. Lymph nodes: No mesenteric or retroperitoneal lymphadenopathy. Vascular:  The abdominal aorta is normal in caliber. Atherosclerotic calcifications of the aorta and its branches. Osseous structures/subcutaneous tissues:  Lucent foci in the L2 and L3 vertebral bodies with associated compression deformities and mildly increased  sclerosis, likely also present on radiographs from 8/19/2021 although difficult to visualize. Large decubitus ulcer at the sacral region again extending to the underlying osseous structures without definite fluid collection. No osseous destruction. PELVIS: The urinary bladder is unremarkable. No pelvic lymphadenopathy or free fluid. Normal uterus. Similar hypodense left adnexal lesion measuring 3.7 x 5.7 cm. Normal right adnexa.   Impression: IMPRESSION: 1.  Similar bilateral renal atrophy with unchanged position of bilateral percutaneous nephrostomy tubes. 2.  Large sacral decubitus ulcer extending to the underlying bone without drainable fluid collection or evidence of osseous destruction. 3.  Simple left ovarian cyst measuring 5.7 cm, not significant changed from prior. Pelvic ultrasound could be considered for further evaluation. 4.  Multiple unchanged vertebral body compression deformities as detailed above. 5.  Extensive bilateral dependent presumed atelectasis, although underlying pneumonia is not excluded. 6.  Small bilateral pleural effusions. I, Attending Radiologist Newton Devine MD, have reviewed the images and report and concur with these findings interpreted by Resident Radiologist, Eliazar Sloan MD.     CT ABDOMEN PELVIS WO CONTRAST  Result Date: 11/19/2021  Narrative: EXAM: CT ABDOMEN PELVIS WO CONTRAST CLINICAL INDICATION:  Nephrostomy catheter displacement TECHNIQUE:  2.5 mm axial slices were performed through the abdomen and pelvis. No IV or oral contrast was administered. FINDINGS: ABDOMEN CT: LUNG BASES:  Small amount of pneumonitis or atelectasis in each lung bases posteriorly. FREE AIR:  None. HERNIA:  No evidence for abdominal wall hernia. Unremarkable percutaneous G-tube with tip in the mid/distal stomach LIVER:  Liver is normal. No mass or biliary ductal dilatation. SPLEEN:  Normal. ADRENALS:  Normal. KIDNEYS: Markedly atrophic left kidney. Moderately atrophic right kidney. Bilateral  percutaneous nephrostomy tubes which are unremarkable in good position with their respective pigtail catheter is in the renal collecting systems bilaterally. No hydronephrosis. No stone or mass involving the kidneys. STOMACH:  Percutaneous G-tube with tip in the mid/distal stomach. PANCREAS:  Normal. GALLBLADDER:Normal. SMA:  Normal. RODY HEPATIS:  Normal. AORTA:  Moderate aortoiliac calcification. No aneurysm. RETROPERITONEUM:  Normal. No adenopathy. PELVIC CT: BLADDER:  The bladder is contracted and poorly visualized. PELVIS:  Uterus is anteverted and normal in size. Probable vascular calcifications at its periphery. The uterus is otherwise normal. 4.0 x 5.5 cm fluid attenuation lesion the left adnexa and no clearly solid or worrisome features but ultrasound is recommended given its size and the patient's age. No apparent prior films exist in the [facility] system for comparison purposes. Right ovary is normal in appearance measuring 1.7 x 2.1 cm in greatest dimension. GI TRACT:  Small bowel and colon are of normal caliber. No bowel wall thickening. APPENDIX:  No radiographic evidence for appendicitis. DIVERTICULA:  No evidence for diverticulitis. BONE: Lumbar spine and bony pelvis are normal in appearance OTHER: Large decubitus ulcer involving mid and lower sacral region but there is no associated fluid collection no evidence for associated abscess. No bony destructive change to suggest underlying osteomyelitis.   Impression: IMPRESSION: Percutaneous nephrostomy tubes are in good position in the renal pelves bilaterally. Large sacral decubitus ulcer without evidence for associated fluid collection/abscess. Moderate atrophy right kidney with marked atrophy of the left.  5.5 similar probable left ovarian cyst. Ultrasound would be helpful in further evaluation.    IR BILIARY/NEPHROSTOMY TUBE PLACEMENTS/CHECKS/EXCHANGES  Result Date: 11/25/2021  Narrative: IR BILIARY TUBE HISTORY: Bilateral nephrostomy drain maintenance  PROCEDURE: Informed consent was obtained from the patient's daughter. After aseptic preparation of the field contrast was injected through each of the 2 nephrostomy tubes to reveal the drain to be in good position with decompression of the collecting systems. Each drain was exchanged over a guidewire for an identical 10 [place] pigtail drain position in its respective renal pelvis as verified by contrast injection. Each of the bilateral nephrostomy drains were secured in position with sutures, connected to gravity drainage, and clean dressings applied.   Impression: IMPRESSION: Bilateral nephrostomy exchange.     US Soft Tissue Buttocks  Result Date: 11/20/2021  Narrative: US SOFT TISSUE BUTTOCKS HISTORY:  on the left buttock adjacent to her wound there is firm induration and areas that feel like discreet pockets - concern for abscess COMPARISON: CT abdomen and pelvis 11/18/2021. TECHNIQUE:  Real time gray scale, color Doppler and spectral wave form analysis was performed on the soft tissue buttocks. Grayscale and color Doppler images submitted for review. FINDINGS: Exam is degraded by large open sacral wound. Within the subcutaneous tissues of the left inferior buttocks adjacent to the large open wound there is a small complex fluid collection measuring approximately 1.9 x 1.3 x 0.8 cm. Small areas of heterotopic ossification within the left buttock subcutaneous soft tissue, as seen on prior CT abdomen pelvis 11/19/2021.   Impression: IMPRESSION: 1.  Exam degraded by large open surgical wound. 2.  Small complex fluid collection measuring 1.9 x 1.3 x 0.8 cm within the subcutaneous tissues of the left inferior buttocks adjacent to sacral wound. Findings could be related to small abscess formation. I, Attending Radiologist Efren Herron MD, have reviewed the images and report and concur with these findings interpreted by Resident Radiologist, Jared Monterroso MD.       ASSESSMENT:  Precious Acharya is a 79 year  old female with PMH of advanced multiple sclerosis, ESRD on hemodialysis, DVT (on coumadin with INR of 1.7 today), chronic respiratory failure on baseline oxygen, bilateral percutaneous nephrostomy tubes, and known stage IV sacral ulcer and lower extremity wounds presenting with altered mental status, lethargy, and worsened diarrhea found to be in septic shock requiring admission to the ICU with pressor support. ID on consult with multiple possible sources. Surgical consult for debridement of left lower extremity necrotic wound with surrounding cellulitis. No gross purulence encountered on bedside exam today. Do not believe wound is source of sepsis, however patient will benefit from OR debridement. Consent obtained from patient's daughter/POA at bedside, patient also agreeable.       PLAN:   -- Continue NPO status  -- Will plan for OR debridement of the left lower extremity necrotic wound this afternoon  -- No need for debridement of right lower extremity necrotic wound as it remains dry  -- We did discuss with the patient that it is possible that her wounds may never heal and she may require further surgical intervention in the future including possible amputation  -- Continue with antibiotics per infectious disease recommendations, currently on IV meropenem, vancomycin. Also received one dose each of IV zosyn and tobramycin. On PO vancomycin prophylaxis given recent C difficile  -- Medical management per critical care  -- Wound care team also on consult  -- Further recommendations to follow OR debridement      NOEL Chino  Acute Care Surgery  [phone]  Supervising Physician Dr. Marlyn Roe MD    After 5 pm please page the on call surgeon for the Acute Care Service at [phone] with any emergent concerns      Addendum:    I have reviewed the patient's chart including labs, imaging, and consult notes. I have interviewed and examined the patient and agree with the above note, assessment, and  treatment plan with modifications as appropriate.      - Bilateral lower extremity wounds covered in necrotic eschar. Right leg with multiple black necrotic wounds, they all appear dry. There is no fluctuance or drainage. There is no surrounding erythema. Left leg has one long necrotic eschar from just below the knee down to her heel. The mid-calf portion has some wet tissue underneath with a foul smell. I was unable to express any purulence. I removed some of the eschar at bedside. There was no sign of purulence but there is surrounding erythema/cellulitis to this wound. I do not feel this is the source of her septic shock, but recommend further exploration and debridement in the operating room. I discussed with the patient and her daughter my concerns that these wounds are not likely to heal, and she may need an above knee amputation. I also explained that she may need debridement of the right leg wounds as well, but given her critical illness, will just do the left leg today to make sure there is not some underlying infection/purulence that I am missing. The patient and her daughter understand, and consent to proceed to the operating room.     Marlyn Roe MD  Acute Care Surgery  [phone]

## 2021-12-14 ENCOUNTER — MYC MEDICAL ADVICE (OUTPATIENT)
Dept: FAMILY MEDICINE | Facility: CLINIC | Age: 41
End: 2021-12-14
Payer: COMMERCIAL

## 2021-12-14 DIAGNOSIS — E11.3299 TYPE 2 DIABETES MELLITUS WITH MILD NONPROLIFERATIVE RETINOPATHY WITHOUT MACULAR EDEMA, WITHOUT LONG-TERM CURRENT USE OF INSULIN, UNSPECIFIED LATERALITY (H): ICD-10-CM

## 2021-12-15 RX ORDER — DULAGLUTIDE 0.75 MG/.5ML
0.75 INJECTION, SOLUTION SUBCUTANEOUS
Qty: 2 ML | Refills: 0 | Status: SHIPPED | OUTPATIENT
Start: 2021-12-15 | End: 2022-01-10

## 2021-12-27 ENCOUNTER — OFFICE VISIT (OUTPATIENT)
Dept: FAMILY MEDICINE | Facility: CLINIC | Age: 41
End: 2021-12-27
Payer: COMMERCIAL

## 2021-12-27 VITALS
SYSTOLIC BLOOD PRESSURE: 134 MMHG | RESPIRATION RATE: 12 BRPM | DIASTOLIC BLOOD PRESSURE: 86 MMHG | HEART RATE: 88 BPM | BODY MASS INDEX: 34.03 KG/M2 | TEMPERATURE: 98.8 F | WEIGHT: 265.2 LBS | HEIGHT: 74 IN

## 2021-12-27 DIAGNOSIS — R22.9 SKIN NODULE: ICD-10-CM

## 2021-12-27 DIAGNOSIS — E11.41 DIABETIC MONONEUROPATHY ASSOCIATED WITH TYPE 2 DIABETES MELLITUS (H): Primary | ICD-10-CM

## 2021-12-27 DIAGNOSIS — L72.3 SEBACEOUS CYST: ICD-10-CM

## 2021-12-27 DIAGNOSIS — D17.30 LIPOMA OF SKIN AND SUBCUTANEOUS TISSUE: ICD-10-CM

## 2021-12-27 PROBLEM — E78.5 HYPERLIPIDEMIA: Status: ACTIVE | Noted: 2017-12-06

## 2021-12-27 PROBLEM — E66.9 OBESITY (BMI 30-39.9): Status: ACTIVE | Noted: 2021-12-27

## 2021-12-27 PROCEDURE — 99214 OFFICE O/P EST MOD 30 MIN: CPT | Performed by: FAMILY MEDICINE

## 2021-12-27 RX ORDER — LOSARTAN POTASSIUM 25 MG/1
25 TABLET ORAL DAILY
Qty: 90 TABLET | Refills: 3 | Status: SHIPPED | OUTPATIENT
Start: 2021-12-27 | End: 2023-02-07

## 2021-12-27 ASSESSMENT — MIFFLIN-ST. JEOR: SCORE: 2183.72

## 2021-12-27 NOTE — ASSESSMENT & PLAN NOTE
Mid back, not currently infected.  Discussed with patient the recommendation to just treat with observance at this point.  If it does become infected discussed using heat pads as well as contacting clinic and then consideration for removal.

## 2021-12-27 NOTE — ASSESSMENT & PLAN NOTE
Lateral left thigh.  Does not bother him or cause any difficulties at this point.  Given its location between muscle bodies did discuss that if he does go outside or become bothersome it could be removed in clinic or through general surgery clinic.

## 2021-12-27 NOTE — PROGRESS NOTES
Problem List Items Addressed This Visit        Nervous and Auditory    Diabetic mononeuropathy associated with type 2 diabetes mellitus (H) - Primary     Doing well by home glucometer of keeping less than 140.  Has annual physical pending with his PCP and will have hemoglobin A1c checked at that time.    Given blood pressure initially elevated upon arrival and still slightly elevated along with his underlying type 2 diabetes discussed the role of using an ARB not only for improving blood pressure control but also kidney protective factor.  Will start on losartan 25 mg daily.  Follow-up with PCP for annual physical as scheduled.  Side effects and precautions with the start of the medication discussed.         Relevant Medications    losartan (COZAAR) 25 MG tablet       Musculoskeletal and Integumentary    Sebaceous cyst     Mid back, not currently infected.  Discussed with patient the recommendation to just treat with observance at this point.  If it does become infected discussed using heat pads as well as contacting clinic and then consideration for removal.         Lipoma of skin and subcutaneous tissue     Lateral left thigh.  Does not bother him or cause any difficulties at this point.  Given its location between muscle bodies did discuss that if he does go outside or become bothersome it could be removed in clinic or through general surgery clinic.         Skin nodule     Left anterior shin.  Consistent with resolving hematoma.  Recommend observation at this time and if does not resolve over the next 1 to 2 months then consider removal.  Also if it does get larger then consider removal.             Return in about 1 month (around 1/28/2022) for Routine preventive, Diabetes.    Subjective   Adam is a 41 year old who presents for the following health issues   Lumps on skin.  Also noted on arrival blood pressure was slightly elevated.  Review of records do show he is type II diabetic.  He has been doing a very  "good job of controlling by his home Accu-Chek readings and also his last A1c.  Overall feeling well.  Does have a spot on his mid upper back that was red and actually drained a few months ago.  Since then it has not actually bothered him.  But he notices if he reaches behind his back that he can still feel a lump in that area.  Also he recently found a lump on his lateral left thigh.  He is not sure how long is been present.  Does not cause any worrisome symptoms like pain or discomfort.  Happened to notice it while he was bathing.  Additionally he noticed about a week ago a lump on the anterior shin of his left leg.  Incidentally he states he would not of come in as he thinks he might of bumped it a day to just be a result from that but since he was coming in for the other lesions he thought he would mention today.       Review of Systems   All other systems reviewed and are negative.           Objective    /86   Pulse 88   Temp 98.8  F (37.1  C) (Oral)   Resp 12   Ht 1.873 m (6' 1.75\")   Wt 120.3 kg (265 lb 3.2 oz)   BMI 34.28 kg/m    Body mass index is 34.28 kg/m .  Physical Exam  Vitals and nursing note reviewed.   Constitutional:       General: He is not in acute distress.     Appearance: Normal appearance. He is not ill-appearing.   HENT:      Head: Normocephalic and atraumatic.   Eyes:      Extraocular Movements: Extraocular movements intact.      Conjunctiva/sclera: Conjunctivae normal.   Pulmonary:      Effort: Pulmonary effort is normal.   Skin:         Neurological:      Mental Status: He is alert and oriented to person, place, and time.   Psychiatric:         Attention and Perception: Attention normal.         Mood and Affect: Mood normal.         Speech: Speech normal.         Thought Content: Thought content normal.                  "

## 2021-12-27 NOTE — ASSESSMENT & PLAN NOTE
Left anterior shin.  Consistent with resolving hematoma.  Recommend observation at this time and if does not resolve over the next 1 to 2 months then consider removal.  Also if it does get larger then consider removal.

## 2021-12-27 NOTE — ASSESSMENT & PLAN NOTE
Doing well by home glucometer of keeping less than 140.  Has annual physical pending with his PCP and will have hemoglobin A1c checked at that time.    Given blood pressure initially elevated upon arrival and still slightly elevated along with his underlying type 2 diabetes discussed the role of using an ARB not only for improving blood pressure control but also kidney protective factor.  Will start on losartan 25 mg daily.  Follow-up with PCP for annual physical as scheduled.  Side effects and precautions with the start of the medication discussed.

## 2022-01-05 DIAGNOSIS — E11.3299 TYPE 2 DIABETES MELLITUS WITH MILD NONPROLIFERATIVE RETINOPATHY WITHOUT MACULAR EDEMA, WITHOUT LONG-TERM CURRENT USE OF INSULIN, UNSPECIFIED LATERALITY (H): ICD-10-CM

## 2022-01-07 NOTE — TELEPHONE ENCOUNTER
"Routing refill request to provider for review/approval because:  Labs not current:  A1C, CR    Last Written Prescription Date:  12/15/21  Last Fill Quantity: 2ml,  # refills: 0   Last office visit provider:  12/27/21     Requested Prescriptions   Pending Prescriptions Disp Refills     dulaglutide (TRULICITY) 0.75 MG/0.5ML pen 2 mL 0     Sig: Inject 0.75 mg Subcutaneous every 7 days       GLP-1 Agonists Protocol Failed - 1/5/2022  2:34 PM        Failed - HgbA1C in past 3 or 6 months     If HgbA1C is 8 or greater, it needs to be on file within the past 3 months.  If less than 8, must be on file within the past 6 months.     Recent Labs   Lab Test 02/17/21  1724   A1C 6.4*             Failed - Normal serum creatinine on file in past 12 months     Recent Labs   Lab Test 09/02/20  1635   CR 1.00       Ok to refill medication if creatinine is low          Passed - Medication is active on med list        Passed - Patient is age 18 or older        Passed - Recent (6 mo) or future (30 days) visit within the authorizing provider's specialty     Patient had office visit in the last 6 months or has a visit in the next 30 days with authorizing provider.  See \"Patient Info\" tab in inbasket, or \"Choose Columns\" in Meds & Orders section of the refill encounter.                 Haritha Andrade RN 01/07/22 3:02 PM  "

## 2022-01-10 RX ORDER — DULAGLUTIDE 0.75 MG/.5ML
0.75 INJECTION, SOLUTION SUBCUTANEOUS
Qty: 2 ML | Refills: 0 | Status: SHIPPED | OUTPATIENT
Start: 2022-01-10 | End: 2022-02-04

## 2022-01-10 NOTE — TELEPHONE ENCOUNTER
Patient is due for a recheck. Please call to assist in scheduling an in person appointment   Bridge fill sent to [facility]

## 2022-01-17 NOTE — TELEPHONE ENCOUNTER
Writer called patient and LMTCB, please assist with making an appointment if patient should return call.

## 2022-01-18 NOTE — TELEPHONE ENCOUNTER
Patient returning call, message relayed.  Pt has appt already scheduled with PCP for 01/28/22 - next Friday.    Pt goes on to share that his timing with PCP has been off as his last two schedule appt were cancelled as PCP was out of office - 10/15/21 and 12/16/21.    He will see PCP next Friday - 01/21/22

## 2022-01-28 ENCOUNTER — OFFICE VISIT (OUTPATIENT)
Dept: FAMILY MEDICINE | Facility: CLINIC | Age: 42
End: 2022-01-28
Payer: COMMERCIAL

## 2022-01-28 VITALS
DIASTOLIC BLOOD PRESSURE: 88 MMHG | HEIGHT: 74 IN | BODY MASS INDEX: 34.14 KG/M2 | HEART RATE: 85 BPM | WEIGHT: 266 LBS | SYSTOLIC BLOOD PRESSURE: 130 MMHG | TEMPERATURE: 98 F | RESPIRATION RATE: 18 BRPM | OXYGEN SATURATION: 98 %

## 2022-01-28 DIAGNOSIS — E11.3299 TYPE 2 DIABETES MELLITUS WITH MILD NONPROLIFERATIVE RETINOPATHY WITHOUT MACULAR EDEMA, WITHOUT LONG-TERM CURRENT USE OF INSULIN, UNSPECIFIED LATERALITY (H): ICD-10-CM

## 2022-01-28 DIAGNOSIS — Z13.220 SCREENING FOR HYPERLIPIDEMIA: Primary | ICD-10-CM

## 2022-01-28 DIAGNOSIS — L72.3 SEBACEOUS CYST: ICD-10-CM

## 2022-01-28 DIAGNOSIS — R03.0 ELEVATED BP WITHOUT DIAGNOSIS OF HYPERTENSION: ICD-10-CM

## 2022-01-28 LAB
ALBUMIN SERPL-MCNC: 4.5 G/DL (ref 3.5–5)
ALP SERPL-CCNC: 78 U/L (ref 45–120)
ALT SERPL W P-5'-P-CCNC: 89 U/L (ref 0–45)
ANION GAP SERPL CALCULATED.3IONS-SCNC: 12 MMOL/L (ref 5–18)
AST SERPL W P-5'-P-CCNC: 42 U/L (ref 0–40)
BILIRUB SERPL-MCNC: 0.8 MG/DL (ref 0–1)
BUN SERPL-MCNC: 11 MG/DL (ref 8–22)
CALCIUM SERPL-MCNC: 10.6 MG/DL (ref 8.5–10.5)
CHLORIDE BLD-SCNC: 99 MMOL/L (ref 98–107)
CHOLEST SERPL-MCNC: 252 MG/DL
CO2 SERPL-SCNC: 27 MMOL/L (ref 22–31)
CREAT SERPL-MCNC: 0.99 MG/DL (ref 0.7–1.3)
CREAT UR-MCNC: 24 MG/DL
FASTING STATUS PATIENT QL REPORTED: NO
GFR SERPL CREATININE-BSD FRML MDRD: >90 ML/MIN/1.73M2
GLUCOSE BLD-MCNC: 117 MG/DL (ref 70–125)
HBA1C MFR BLD: 7.4 % (ref 0–5.6)
HDLC SERPL-MCNC: 42 MG/DL
LDLC SERPL CALC-MCNC: 137 MG/DL
MICROALBUMIN UR-MCNC: <0.5 MG/DL (ref 0–1.99)
MICROALBUMIN/CREAT UR: NORMAL MG/G{CREAT}
POTASSIUM BLD-SCNC: 4.4 MMOL/L (ref 3.5–5)
PROT SERPL-MCNC: 8.6 G/DL (ref 6–8)
SODIUM SERPL-SCNC: 138 MMOL/L (ref 136–145)
TRIGL SERPL-MCNC: 366 MG/DL

## 2022-01-28 PROCEDURE — 82043 UR ALBUMIN QUANTITATIVE: CPT | Performed by: FAMILY MEDICINE

## 2022-01-28 PROCEDURE — 80061 LIPID PANEL: CPT | Performed by: FAMILY MEDICINE

## 2022-01-28 PROCEDURE — 80053 COMPREHEN METABOLIC PANEL: CPT | Performed by: FAMILY MEDICINE

## 2022-01-28 PROCEDURE — 83036 HEMOGLOBIN GLYCOSYLATED A1C: CPT | Performed by: FAMILY MEDICINE

## 2022-01-28 PROCEDURE — 90471 IMMUNIZATION ADMIN: CPT | Performed by: FAMILY MEDICINE

## 2022-01-28 PROCEDURE — 99215 OFFICE O/P EST HI 40 MIN: CPT | Mod: 25 | Performed by: FAMILY MEDICINE

## 2022-01-28 PROCEDURE — 36415 COLL VENOUS BLD VENIPUNCTURE: CPT | Performed by: FAMILY MEDICINE

## 2022-01-28 PROCEDURE — 90686 IIV4 VACC NO PRSV 0.5 ML IM: CPT | Performed by: FAMILY MEDICINE

## 2022-01-28 RX ORDER — DULAGLUTIDE 0.75 MG/.5ML
0.75 INJECTION, SOLUTION SUBCUTANEOUS
Qty: 2 ML | Refills: 0 | Status: CANCELLED | OUTPATIENT
Start: 2022-01-28

## 2022-01-28 ASSESSMENT — MIFFLIN-ST. JEOR: SCORE: 2187.35

## 2022-01-28 NOTE — PROGRESS NOTES
Assessment & Plan     Type 2 diabetes mellitus with mild nonproliferative retinopathy without macular edema, without long-term current use of insulin, unspecified laterality (H)    - metFORMIN (GLUCOPHAGE) 1000 MG tablet; Take 1 tablet (1,000 mg) by mouth 2 times daily (with meals)  - HEMOGLOBIN A1C; Future  - Albumin Random Urine Quantitative with Creat Ratio; Future  - blood glucose (NO BRAND SPECIFIED) test strip; Use to test blood sugar 1 times daily or as directed.  - Comprehensive metabolic panel (BMP + Alb, Alk Phos, ALT, AST, Total. Bili, TP); Future  - HEMOGLOBIN A1C  - Comprehensive metabolic panel (BMP + Alb, Alk Phos, ALT, AST, Total. Bili, TP)  Elevated liver enzymes but stable in comparison  Elevated calcium; will monitor  Keep hydrated      - Albumin Random Urine Quantitative with Creat Ratio  Normal    Elevated A1c at 7.4, considerably raised from 6.41 from 11 months ago.     Recommendation:   Changing Trulicity dosage to 1.5 mg/inj.  Continue all diabetic meds as is, monitor blood sugar with a goal to keep average below 150.  Follow-up to recheck in 3 months        Screening for hyperlipidemia  Elevated liver panel/cholesterol/LDL/triglycerides.  Make an attempt to improve diet, cut back on the carbs and fats,  and exercise more efficiently   With concomitant diabetes, recommending statin (lipid lowering medication) to mitigate cardiovascular risk.   - Lipid panel reflex to direct LDL Fasting    Elevated BP without diagnosis of hypertension  Will continue to monitor     Sebaceous cyst  Noted on various torso locations.   However, nodular finding on the right lower leg suggestive of hematoma  Plan: > will monitor and follow up as needed         I spent a total of 52 minutes on the day of the visit.   Time spent doing chart review, history and exam, documentation and further activities per the note             Return in about 3 months (around 4/28/2022) for Follow up.    MD ROMMEL Peña  "[facility] TEMO Medina is a 41 year old who presents for med check and follow-up diabetes and hypertension.   He needs refill on lancets and test strips and questioning the reason to start losartan as he has not began to taking yet.  In review of the chart was reasoned for his blood pressure and renal protection.    Has also noted to have bumps on the tip of his penis which would want to have it looked at, first noticed about 3 weeks ago not associated any pain or discharge.    Has also found to be cyst on his body in various places that wanted to mention.  There is one on his right lower leg which came on more acutely, possibly with an injury, but not recalled.       Review of Systems         Objective    /88   Pulse 85   Temp 98  F (36.7  C)   Resp 18   Ht 1.873 m (6' 1.75\")   Wt 120.7 kg (266 lb)   SpO2 98%   BMI 34.38 kg/m    Body mass index is 34.38 kg/m .     Physical Exam   GENERAL: healthy, alert and no distress  NECK: no adenopathy, no asymmetry, masses, or scars and thyroid normal to palpation  RESP: lungs clear to auscultation - no rales, rhonchi or wheezes  CV: regular rate and rhythm, normal S1 S2, no S3 or S4, no murmur, click or rub, no peripheral edema and peripheral pulses strong  Normal penile exam no palpable cyst. ( patient states has noted it when erect)   Back: seb cyst   Right lower leg: soft , squeezable nodule, consider Hematoma                     "

## 2022-02-03 ENCOUNTER — TRANSFERRED RECORDS (OUTPATIENT)
Dept: HEALTH INFORMATION MANAGEMENT | Facility: CLINIC | Age: 42
End: 2022-02-03
Payer: COMMERCIAL

## 2022-02-04 RX ORDER — DULAGLUTIDE 1.5 MG/.5ML
1.5 INJECTION, SOLUTION SUBCUTANEOUS
Qty: 6 ML | Refills: 0 | Status: SHIPPED | OUTPATIENT
Start: 2022-02-04 | End: 2022-05-25

## 2022-02-14 ENCOUNTER — TRANSFERRED RECORDS (OUTPATIENT)
Dept: HEALTH INFORMATION MANAGEMENT | Facility: CLINIC | Age: 42
End: 2022-02-14
Payer: COMMERCIAL

## 2022-02-14 LAB — RETINOPATHY: POSITIVE

## 2022-05-20 DIAGNOSIS — E11.3299 TYPE 2 DIABETES MELLITUS WITH MILD NONPROLIFERATIVE RETINOPATHY WITHOUT MACULAR EDEMA, WITHOUT LONG-TERM CURRENT USE OF INSULIN, UNSPECIFIED LATERALITY (H): ICD-10-CM

## 2022-05-20 NOTE — TELEPHONE ENCOUNTER
"Routing refill request to provider for review/approval because:  Early refill requested.    Last Written Prescription Date:  1/28/22  Last Fill Quantity: 180,  # refills: 1   Last office visit provider:  1/28/22     Requested Prescriptions   Pending Prescriptions Disp Refills     metFORMIN (GLUCOPHAGE) 1000 MG tablet 180 tablet 1     Sig: Take 1 tablet (1,000 mg) by mouth 2 times daily (with meals)       Biguanide Agents Passed - 5/20/2022  1:22 PM        Passed - Patient is age 10 or older        Passed - Patient has documented A1c within the specified period of time.     If HgbA1C is 8 or greater, it needs to be on file within the past 3 months.  If less than 8, must be on file within the past 6 months.     Recent Labs   Lab Test 01/28/22  1714   A1C 7.4*             Passed - Patient's CR is NOT>1.4 OR Patient's EGFR is NOT<45 within past 12 mos.     Recent Labs   Lab Test 01/28/22  1714 09/02/20  1635   GFRESTIMATED >90 >60   GFRESTBLACK  --  >60       Recent Labs   Lab Test 01/28/22  1714   CR 0.99             Passed - Patient does NOT have a diagnosis of CHF.        Passed - Medication is active on med list        Passed - Recent (6 mo) or future (30 days) visit within the authorizing provider's specialty     Patient had office visit in the last 6 months or has a visit in the next 30 days with authorizing provider or within the authorizing provider's specialty.  See \"Patient Info\" tab in inbasket, or \"Choose Columns\" in Meds & Orders section of the refill encounter.                 Jerson Gonzalez RN 05/20/22 1:23 PM  "

## 2022-05-20 NOTE — TELEPHONE ENCOUNTER
[facility] FAX 05/18/2022 18:24 PM Refill Request     Metformin HCL 1,000 MG tablet  Trulicity  1.5 MG/0.5 ML Pen (Writer is unable to locate on med list)

## 2022-05-26 DIAGNOSIS — E11.3299 TYPE 2 DIABETES MELLITUS WITH MILD NONPROLIFERATIVE RETINOPATHY WITHOUT MACULAR EDEMA, WITHOUT LONG-TERM CURRENT USE OF INSULIN, UNSPECIFIED LATERALITY (H): ICD-10-CM

## 2022-05-27 RX ORDER — DULAGLUTIDE 1.5 MG/.5ML
1.5 INJECTION, SOLUTION SUBCUTANEOUS
Qty: 6 ML | Refills: 0 | OUTPATIENT
Start: 2022-05-27

## 2022-06-01 DIAGNOSIS — E11.3299 TYPE 2 DIABETES MELLITUS WITH MILD NONPROLIFERATIVE RETINOPATHY WITHOUT MACULAR EDEMA, WITHOUT LONG-TERM CURRENT USE OF INSULIN, UNSPECIFIED LATERALITY (H): ICD-10-CM

## 2022-06-03 NOTE — TELEPHONE ENCOUNTER
Writer called patient stated that he will NOT run out of his meds prior to this 8/1 appointment and he would like to keep that date.

## 2022-06-04 NOTE — TELEPHONE ENCOUNTER
"Last Written Prescription Date:  5/20/22  Last Fill Quantity: 60,  # refills: 0   Last office visit provider:  1/28/22     Requested Prescriptions   Pending Prescriptions Disp Refills     metFORMIN (GLUCOPHAGE) 1000 MG tablet 60 tablet 0     Sig: Take 1 tablet (1,000 mg) by mouth 2 times daily (with meals)       Biguanide Agents Passed - 6/2/2022  9:34 AM        Passed - Patient is age 10 or older        Passed - Patient has documented A1c within the specified period of time.     If HgbA1C is 8 or greater, it needs to be on file within the past 3 months.  If less than 8, must be on file within the past 6 months.     Recent Labs   Lab Test 01/28/22  1714   A1C 7.4*             Passed - Patient's CR is NOT>1.4 OR Patient's EGFR is NOT<45 within past 12 mos.     Recent Labs   Lab Test 01/28/22  1714 09/02/20  1635   GFRESTIMATED >90 >60   GFRESTBLACK  --  >60       Recent Labs   Lab Test 01/28/22  1714   CR 0.99             Passed - Patient does NOT have a diagnosis of CHF.        Passed - Medication is active on med list        Passed - Recent (6 mo) or future (30 days) visit within the authorizing provider's specialty     Patient had office visit in the last 6 months or has a visit in the next 30 days with authorizing provider or within the authorizing provider's specialty.  See \"Patient Info\" tab in inbasket, or \"Choose Columns\" in Meds & Orders section of the refill encounter.                 Milena Hannon RN 06/03/22 7:00 PM  "

## 2022-08-01 ENCOUNTER — OFFICE VISIT (OUTPATIENT)
Dept: FAMILY MEDICINE | Facility: CLINIC | Age: 42
End: 2022-08-01
Payer: COMMERCIAL

## 2022-08-01 VITALS
DIASTOLIC BLOOD PRESSURE: 85 MMHG | SYSTOLIC BLOOD PRESSURE: 130 MMHG | WEIGHT: 264 LBS | TEMPERATURE: 98.6 F | HEIGHT: 74 IN | BODY MASS INDEX: 33.88 KG/M2 | RESPIRATION RATE: 18 BRPM | HEART RATE: 78 BPM | OXYGEN SATURATION: 99 %

## 2022-08-01 DIAGNOSIS — E11.3299 TYPE 2 DIABETES MELLITUS WITH MILD NONPROLIFERATIVE RETINOPATHY WITHOUT MACULAR EDEMA, WITHOUT LONG-TERM CURRENT USE OF INSULIN, UNSPECIFIED LATERALITY (H): Primary | ICD-10-CM

## 2022-08-01 DIAGNOSIS — E78.5 HYPERLIPIDEMIA, UNSPECIFIED HYPERLIPIDEMIA TYPE: ICD-10-CM

## 2022-08-01 DIAGNOSIS — B35.1 ONYCHOMYCOSIS: ICD-10-CM

## 2022-08-01 DIAGNOSIS — M65.30 TRIGGER FINGER, ACQUIRED: ICD-10-CM

## 2022-08-01 LAB — HBA1C MFR BLD: 7.1 % (ref 0–5.6)

## 2022-08-01 PROCEDURE — 80061 LIPID PANEL: CPT | Performed by: FAMILY MEDICINE

## 2022-08-01 PROCEDURE — 83036 HEMOGLOBIN GLYCOSYLATED A1C: CPT | Performed by: FAMILY MEDICINE

## 2022-08-01 PROCEDURE — 87101 SKIN FUNGI CULTURE: CPT | Performed by: FAMILY MEDICINE

## 2022-08-01 PROCEDURE — 87107 FUNGI IDENTIFICATION MOLD: CPT | Performed by: FAMILY MEDICINE

## 2022-08-01 PROCEDURE — 82043 UR ALBUMIN QUANTITATIVE: CPT | Performed by: FAMILY MEDICINE

## 2022-08-01 PROCEDURE — 80053 COMPREHEN METABOLIC PANEL: CPT | Performed by: FAMILY MEDICINE

## 2022-08-01 PROCEDURE — 99214 OFFICE O/P EST MOD 30 MIN: CPT | Performed by: FAMILY MEDICINE

## 2022-08-01 PROCEDURE — 36415 COLL VENOUS BLD VENIPUNCTURE: CPT | Performed by: FAMILY MEDICINE

## 2022-08-01 ASSESSMENT — PAIN SCALES - GENERAL: PAINLEVEL: NO PAIN (0)

## 2022-08-01 NOTE — PROGRESS NOTES
Assessment & Plan     (E11.9425) Type 2 diabetes mellitus with mild nonproliferative retinopathy without macular edema, without long-term current use of insulin, unspecified laterality (H)  (primary encounter diagnosis)  Comment:   Plan: Hemoglobin A1c, Comprehensive metabolic panel,         Albumin Random Urine Quantitative with Creat         Ratio, dulaglutide (TRULICITY) 1.5 MG/0.5ML         pen, metFORMIN (GLUCOPHAGE) 1000 MG tablet          A1c is elevated, however satisfactory at 7.1  Continue with the current ongoing treatment    (E78.5) Hyperlipidemia, unspecified hyperlipidemia type  Comment: not on statin   Plan: Lipid panel reflex to direct LDL Fasting        Elevated cholesterol/LDL  Recommending statin (lipid lowering medication) to mitigate cardiovascular risk    (M65.30) Trigger finger, acquired  Comment:   Plan: Orthopedic  Referral            (B35.1) Onychomycosis  Comment:   Plan: Fungus Culture,  skin, hair, or nail        Positive   Tx: lamisil.   Check ALT monthly                        No follow-ups on file.    Jaron You MD  [facility]   Adam is a 42 year old who presents for med check and follow-up on diabetes.  He has been seeing a dermatologist with having a rash on his lower extremities which is thought to be caused by dermatophytosis, and toenail infection caused by toenail fungus, and been advised to follow-up in primary for treatment of the toenail infection first     Has this condition of right index finger curling up when typing, and was previously told that he has a trigger finger     Diabetes   On Trulicity and metformin  Blood glucose has not being checked regularly  He would like to have his labs done today        History of Present Illness       Diabetes:   He presents for follow up of diabetes.  He is not checking blood glucose. He has no concerns regarding his diabetes at this time.  He is having numbness in feet.  "        He eats 2-3 servings of fruits and vegetables daily.He consumes 0 sweetened beverage(s) daily.He exercises with enough effort to increase his heart rate 10 to 19 minutes per day.  He exercises with enough effort to increase his heart rate 4 days per week.   He is taking medications regularly.       Review of Systems         Objective    /85 (BP Location: Right arm, Patient Position: Sitting, Cuff Size: Adult Regular)   Pulse 78   Temp 98.6  F (37  C) (Oral)   Resp 18   Ht 1.873 m (6' 1.75\")   Wt 119.7 kg (264 lb)   SpO2 99%   BMI 34.13 kg/m    Body mass index is 34.13 kg/m .       Physical Exam   GENERAL: healthy, alert and no distress  RESP: lungs clear to auscultation - no rales, rhonchi or wheezes  CV: regular rate and rhythm, normal S1 S2, no S3 or S4, no murmur, click or rub, no peripheral edema and peripheral pulses strong  MS: Trigger finger of the right index finger, other extremities normal no edema  SKIN: Onychomycosis of the toenails                     .  ..  "

## 2022-08-02 LAB
ALBUMIN SERPL BCG-MCNC: 4.8 G/DL (ref 3.5–5.2)
ALP SERPL-CCNC: 70 U/L (ref 40–129)
ALT SERPL W P-5'-P-CCNC: 80 U/L (ref 10–50)
ANION GAP SERPL CALCULATED.3IONS-SCNC: 13 MMOL/L (ref 7–15)
AST SERPL W P-5'-P-CCNC: 49 U/L (ref 10–50)
BILIRUB SERPL-MCNC: 0.5 MG/DL
BUN SERPL-MCNC: 13.2 MG/DL (ref 6–20)
CALCIUM SERPL-MCNC: 10.1 MG/DL (ref 8.6–10)
CHLORIDE SERPL-SCNC: 98 MMOL/L (ref 98–107)
CHOLEST SERPL-MCNC: 230 MG/DL
CREAT SERPL-MCNC: 0.87 MG/DL (ref 0.67–1.17)
CREAT UR-MCNC: 25.1 MG/DL
DEPRECATED HCO3 PLAS-SCNC: 26 MMOL/L (ref 22–29)
GFR SERPL CREATININE-BSD FRML MDRD: >90 ML/MIN/1.73M2
GLUCOSE SERPL-MCNC: 117 MG/DL (ref 70–99)
HDLC SERPL-MCNC: 41 MG/DL
LDLC SERPL CALC-MCNC: 132 MG/DL
MICROALBUMIN UR-MCNC: <12 MG/L
MICROALBUMIN/CREAT UR: NORMAL MG/G{CREAT}
NONHDLC SERPL-MCNC: 189 MG/DL
POTASSIUM SERPL-SCNC: 4.2 MMOL/L (ref 3.4–5.3)
PROT SERPL-MCNC: 8 G/DL (ref 6.4–8.3)
SODIUM SERPL-SCNC: 137 MMOL/L (ref 136–145)
TRIGL SERPL-MCNC: 285 MG/DL

## 2022-08-06 RX ORDER — DULAGLUTIDE 1.5 MG/.5ML
1.5 INJECTION, SOLUTION SUBCUTANEOUS
Qty: 6 ML | Refills: 1 | Status: SHIPPED | OUTPATIENT
Start: 2022-08-06 | End: 2023-02-16

## 2022-08-06 RX ORDER — TERBINAFINE HYDROCHLORIDE 250 MG/1
250 TABLET ORAL DAILY
Qty: 90 TABLET | Refills: 0 | Status: SHIPPED | OUTPATIENT
Start: 2022-08-06 | End: 2022-11-04

## 2022-08-29 LAB — BACTERIA SPEC CULT: ABNORMAL

## 2022-09-03 ENCOUNTER — HEALTH MAINTENANCE LETTER (OUTPATIENT)
Age: 42
End: 2022-09-03

## 2023-01-16 ENCOUNTER — TRANSFERRED RECORDS (OUTPATIENT)
Dept: HEALTH INFORMATION MANAGEMENT | Facility: CLINIC | Age: 43
End: 2023-01-16

## 2023-01-16 LAB — RETINOPATHY: POSITIVE

## 2023-02-07 DIAGNOSIS — E11.41 DIABETIC MONONEUROPATHY ASSOCIATED WITH TYPE 2 DIABETES MELLITUS (H): ICD-10-CM

## 2023-02-07 RX ORDER — LOSARTAN POTASSIUM 25 MG/1
TABLET ORAL
Qty: 90 TABLET | Refills: 1 | Status: SHIPPED | OUTPATIENT
Start: 2023-02-07 | End: 2023-03-01

## 2023-02-07 NOTE — TELEPHONE ENCOUNTER
"    Last Written Prescription Date:  12/27/21  Last Fill Quantity: 90,  # refills: 3   Last office visit provider:  8/1/22     Requested Prescriptions   Pending Prescriptions Disp Refills     losartan (COZAAR) 25 MG tablet [Pharmacy Med Name: LOSARTAN POTASSIUM 25 MG TAB] 90 tablet 3     Sig: TAKE 1 TABLET BY MOUTH EVERY DAY       Angiotensin-II Receptors Failed - 2/7/2023 11:25 AM        Failed - Recent (12 mo) or future (30 days) visit within the authorizing provider's specialty     Patient has had an office visit with the authorizing provider or a provider within the authorizing providers department within the previous 12 mos or has a future within next 30 days. See \"Patient Info\" tab in inbasket, or \"Choose Columns\" in Meds & Orders section of the refill encounter.              Passed - Last blood pressure under 140/90 in past 12 months     BP Readings from Last 3 Encounters:   08/01/22 130/85   01/28/22 130/88   12/27/21 134/86                 Passed - Medication is active on med list        Passed - Patient is age 18 or older        Passed - Normal serum creatinine on file in past 12 months     Recent Labs   Lab Test 08/01/22  1658   CR 0.87       Ok to refill medication if creatinine is low          Passed - Normal serum potassium on file in past 12 months     Recent Labs   Lab Test 08/01/22  1658   POTASSIUM 4.2                         Milena Hannon, RN 02/07/23 3:55 PM  "

## 2023-03-01 ENCOUNTER — OFFICE VISIT (OUTPATIENT)
Dept: FAMILY MEDICINE | Facility: CLINIC | Age: 43
End: 2023-03-01
Payer: COMMERCIAL

## 2023-03-01 VITALS
WEIGHT: 269.5 LBS | DIASTOLIC BLOOD PRESSURE: 103 MMHG | HEART RATE: 96 BPM | OXYGEN SATURATION: 99 % | HEIGHT: 74 IN | SYSTOLIC BLOOD PRESSURE: 162 MMHG | TEMPERATURE: 98.4 F | BODY MASS INDEX: 34.59 KG/M2

## 2023-03-01 DIAGNOSIS — E78.5 HYPERLIPIDEMIA, UNSPECIFIED HYPERLIPIDEMIA TYPE: ICD-10-CM

## 2023-03-01 DIAGNOSIS — E11.3299 TYPE 2 DIABETES MELLITUS WITH MILD NONPROLIFERATIVE RETINOPATHY WITHOUT MACULAR EDEMA, WITHOUT LONG-TERM CURRENT USE OF INSULIN, UNSPECIFIED LATERALITY (H): ICD-10-CM

## 2023-03-01 DIAGNOSIS — R74.8 ELEVATED LIVER ENZYMES: ICD-10-CM

## 2023-03-01 DIAGNOSIS — R07.9 CHEST PAIN, UNSPECIFIED TYPE: Primary | ICD-10-CM

## 2023-03-01 DIAGNOSIS — I10 BENIGN ESSENTIAL HYPERTENSION: ICD-10-CM

## 2023-03-01 LAB
ALBUMIN SERPL BCG-MCNC: 5 G/DL (ref 3.5–5.2)
ALP SERPL-CCNC: 78 U/L (ref 40–129)
ALT SERPL W P-5'-P-CCNC: 118 U/L (ref 10–50)
ANION GAP SERPL CALCULATED.3IONS-SCNC: 16 MMOL/L (ref 7–15)
AST SERPL W P-5'-P-CCNC: 76 U/L (ref 10–50)
BILIRUB SERPL-MCNC: 0.7 MG/DL
BUN SERPL-MCNC: 12.5 MG/DL (ref 6–20)
CALCIUM SERPL-MCNC: 10.6 MG/DL (ref 8.6–10)
CHLORIDE SERPL-SCNC: 96 MMOL/L (ref 98–107)
CHOLEST SERPL-MCNC: 258 MG/DL
CREAT SERPL-MCNC: 0.93 MG/DL (ref 0.67–1.17)
DEPRECATED HCO3 PLAS-SCNC: 25 MMOL/L (ref 22–29)
GFR SERPL CREATININE-BSD FRML MDRD: >90 ML/MIN/1.73M2
GLUCOSE SERPL-MCNC: 138 MG/DL (ref 70–99)
HBA1C MFR BLD: 8 % (ref 0–5.6)
HDLC SERPL-MCNC: 40 MG/DL
LDLC SERPL CALC-MCNC: ABNORMAL MG/DL
LDLC SERPL DIRECT ASSAY-MCNC: 150 MG/DL
NONHDLC SERPL-MCNC: 218 MG/DL
POTASSIUM SERPL-SCNC: 4.4 MMOL/L (ref 3.4–5.3)
PROT SERPL-MCNC: 8.8 G/DL (ref 6.4–8.3)
SODIUM SERPL-SCNC: 137 MMOL/L (ref 136–145)
TRIGL SERPL-MCNC: 413 MG/DL

## 2023-03-01 PROCEDURE — 91312 COVID-19 VACCINE BIVALENT BOOSTER 12+ (PFIZER): CPT | Performed by: FAMILY MEDICINE

## 2023-03-01 PROCEDURE — 0124A COVID-19 VACCINE BIVALENT BOOSTER 12+ (PFIZER): CPT | Performed by: FAMILY MEDICINE

## 2023-03-01 PROCEDURE — 80061 LIPID PANEL: CPT | Performed by: FAMILY MEDICINE

## 2023-03-01 PROCEDURE — 36415 COLL VENOUS BLD VENIPUNCTURE: CPT | Performed by: FAMILY MEDICINE

## 2023-03-01 PROCEDURE — 83721 ASSAY OF BLOOD LIPOPROTEIN: CPT | Mod: 59 | Performed by: FAMILY MEDICINE

## 2023-03-01 PROCEDURE — 83036 HEMOGLOBIN GLYCOSYLATED A1C: CPT | Performed by: FAMILY MEDICINE

## 2023-03-01 PROCEDURE — 90677 PCV20 VACCINE IM: CPT | Performed by: FAMILY MEDICINE

## 2023-03-01 PROCEDURE — 90471 IMMUNIZATION ADMIN: CPT | Performed by: FAMILY MEDICINE

## 2023-03-01 PROCEDURE — 99215 OFFICE O/P EST HI 40 MIN: CPT | Mod: 25 | Performed by: FAMILY MEDICINE

## 2023-03-01 PROCEDURE — 80053 COMPREHEN METABOLIC PANEL: CPT | Performed by: FAMILY MEDICINE

## 2023-03-01 RX ORDER — LOSARTAN POTASSIUM 50 MG/1
50 TABLET ORAL DAILY
Qty: 30 TABLET | Refills: 0 | Status: SHIPPED | OUTPATIENT
Start: 2023-03-01 | End: 2023-04-24

## 2023-03-01 ASSESSMENT — PAIN SCALES - GENERAL: PAINLEVEL: NO PAIN (0)

## 2023-03-01 NOTE — PROGRESS NOTES
Assessment & Plan     Chest pain, unspecified type  Discussed a stress echocardiogram for further evaluation  - Echocardiogram Exercise Stress; Future  Reminded to go to the emergency room with any recurrent symptoms in the meantime.     Type 2 diabetes mellitus with mild nonproliferative retinopathy without macular edema, without long-term current use of insulin, unspecified laterality (H)    On Trulicity 1.5 mg injections every 7 days, metformin 1000 mg twice a day.    - Hemoglobin A1c; Future  - Comprehensive metabolic panel; Future    metabolic panel shows an elevated blood glucose, and liver enzymes, with elevated cholesterol/LDL.  A1c has risen to 8 from 7.1 when checked several months ago.      Hyperlipidemia, unspecified hyperlipidemia type    - Lipid panel reflex to direct LDL Fasting  - LDL cholesterol direct  Hold off starting statin due to elevated liver enzymes  - plan: Follow diabetic diet , improve glycemic control  Recheck level in 3 months    Benign essential hypertension  Not optimized on losartan 25 mg daily  Start:   - losartan (COZAAR) 50 MG tablet; Take 1 tablet (50 mg) by mouth daily  Recheck in a month      I spent a total of 63 minutes on the day of the visit.   Time spent doing chart review, history and exam, documentation and further activities per the note                 Return in about 4 weeks (around 3/29/2023) for Follow up, recheck blood pressure.    Jaron You MD  [facility]    Yash Medina is a 42 year old, presenting  for a follow up on his diabetes.  He is on Trulicity 1.5 mg injections every 7 days, metformin 1000 mg twice a day.      He would also like to discuss chest pain coming on at random with no rhyme or reasons or with any physical exertion in the past 3 weeks.  He denies any chest pain now.        Review of Systems         Objective    BP (!) 162/103 (BP Location: Right arm, Patient Position: Right side, Cuff Size: Adult Regular)    "Pulse 96   Temp 98.4  F (36.9  C) (Oral)   Ht 1.873 m (6' 1.75\")   Wt 122.2 kg (269 lb 8 oz)   SpO2 99%   BMI 34.84 kg/m    Body mass index is 34.84 kg/m .     Physical Exam   GENERAL: healthy, alert and no distress  CV: regular rate and rhythm, normal S1 S2, no S3 or S4, no murmur, click or rub, no peripheral edema and peripheral pulses strong  ABDOMEN: soft, nontender, no hepatosplenomegaly, no masses and bowel sounds normal  MS: no gross musculoskeletal defects noted, no edema  Diabetic foot exam: normal DP and PT pulses, no trophic changes or ulcerative lesions and decreased sensation in the forefoot                     "

## 2023-03-03 PROBLEM — E11.3299 TYPE 2 DIABETES MELLITUS WITH MILD NONPROLIFERATIVE RETINOPATHY WITHOUT MACULAR EDEMA, WITHOUT LONG-TERM CURRENT USE OF INSULIN, UNSPECIFIED LATERALITY (H): Status: ACTIVE | Noted: 2023-03-03

## 2023-03-03 PROBLEM — R74.8 ELEVATED LIVER ENZYMES: Status: ACTIVE | Noted: 2023-03-03

## 2023-03-03 RX ORDER — DULAGLUTIDE 1.5 MG/.5ML
1.5 INJECTION, SOLUTION SUBCUTANEOUS
Qty: 6 ML | Refills: 0 | Status: SHIPPED | OUTPATIENT
Start: 2023-03-03 | End: 2023-06-27

## 2023-03-20 ENCOUNTER — TRANSFERRED RECORDS (OUTPATIENT)
Dept: HEALTH INFORMATION MANAGEMENT | Facility: CLINIC | Age: 43
End: 2023-03-20

## 2023-03-20 LAB — RETINOPATHY: POSITIVE

## 2023-03-23 ENCOUNTER — HOSPITAL ENCOUNTER (OUTPATIENT)
Dept: CARDIOLOGY | Facility: CLINIC | Age: 43
Discharge: HOME | End: 2023-03-23
Attending: FAMILY MEDICINE | Admitting: FAMILY MEDICINE
Payer: COMMERCIAL

## 2023-03-23 DIAGNOSIS — R07.9 CHEST PAIN, UNSPECIFIED TYPE: ICD-10-CM

## 2023-03-23 PROCEDURE — 93017 CV STRESS TEST TRACING ONLY: CPT

## 2023-03-23 PROCEDURE — 93352 ADMIN ECG CONTRAST AGENT: CPT | Performed by: INTERNAL MEDICINE

## 2023-03-23 PROCEDURE — 93018 CV STRESS TEST I&R ONLY: CPT | Performed by: INTERNAL MEDICINE

## 2023-03-23 PROCEDURE — 93325 DOPPLER ECHO COLOR FLOW MAPG: CPT | Mod: 26 | Performed by: INTERNAL MEDICINE

## 2023-03-23 PROCEDURE — 93350 STRESS TTE ONLY: CPT | Mod: 26 | Performed by: INTERNAL MEDICINE

## 2023-03-23 PROCEDURE — 93016 CV STRESS TEST SUPVJ ONLY: CPT | Performed by: INTERNAL MEDICINE

## 2023-03-23 PROCEDURE — 93321 DOPPLER ECHO F-UP/LMTD STD: CPT | Mod: 26 | Performed by: INTERNAL MEDICINE

## 2023-03-23 PROCEDURE — 999N000208 ECHO STRESS ECHOCARDIOGRAM

## 2023-03-23 PROCEDURE — 255N000002 HC RX 255 OP 636: Performed by: FAMILY MEDICINE

## 2023-03-23 RX ADMIN — PERFLUTREN 6 ML: 6.52 INJECTION, SUSPENSION INTRAVENOUS at 15:40

## 2023-03-30 DIAGNOSIS — E11.3299 TYPE 2 DIABETES MELLITUS WITH MILD NONPROLIFERATIVE RETINOPATHY WITHOUT MACULAR EDEMA, WITHOUT LONG-TERM CURRENT USE OF INSULIN, UNSPECIFIED LATERALITY (H): ICD-10-CM

## 2023-03-30 NOTE — TELEPHONE ENCOUNTER
Fax request from pharmacy for refill with note that this medication needs to be sent as a 90 day supply due to insurance coverage. Insurance will only cover 90 day supply of medication.

## 2023-03-31 NOTE — TELEPHONE ENCOUNTER
"Last Written Prescription Date:  2/17/23  Last Fill Quantity: 60,  # refills: 0   Last office visit provider:  3/1/23     Requested Prescriptions   Pending Prescriptions Disp Refills     metFORMIN (GLUCOPHAGE) 1000 MG tablet 60 tablet 0     Sig: Take 1 tablet (1,000 mg) by mouth 2 times daily (with meals)       Biguanide Agents Passed - 3/30/2023  4:24 PM        Passed - Patient is age 10 or older        Passed - Patient has documented A1c within the specified period of time.     If HgbA1C is 8 or greater, it needs to be on file within the past 3 months.  If less than 8, must be on file within the past 6 months.     Recent Labs   Lab Test 03/01/23  1730   A1C 8.0*             Passed - Patient's CR is NOT>1.4 OR Patient's EGFR is NOT<45 within past 12 mos.     Recent Labs   Lab Test 03/01/23  1730 01/28/22  1714 09/02/20  1635   GFRESTIMATED >90   < > >60   GFRESTBLACK  --   --  >60    < > = values in this interval not displayed.       Recent Labs   Lab Test 03/01/23  1730   CR 0.93             Passed - Patient does NOT have a diagnosis of CHF.        Passed - Medication is active on med list        Passed - Recent (6 mo) or future (30 days) visit within the authorizing provider's specialty     Patient had office visit in the last 6 months or has a visit in the next 30 days with authorizing provider or within the authorizing provider's specialty.  See \"Patient Info\" tab in inbasket, or \"Choose Columns\" in Meds & Orders section of the refill encounter.                 Cony Mcpherson RN 03/31/23 1:38 PM  "

## 2023-04-05 PROBLEM — Z92.89 HX OF ECHOCARDIOGRAM: Status: ACTIVE | Noted: 2023-04-05

## 2023-04-20 DIAGNOSIS — I10 BENIGN ESSENTIAL HYPERTENSION: ICD-10-CM

## 2023-04-21 NOTE — TELEPHONE ENCOUNTER
"Routing refill request to provider for review/approval because:  BP fails     Last Written Prescription Date:  3/1/23  Last Fill Quantity: 30,  # refills: 0   Last office visit provider:  3/1/23     Requested Prescriptions   Pending Prescriptions Disp Refills     losartan (COZAAR) 50 MG tablet 30 tablet 0     Sig: Take 1 tablet (50 mg) by mouth daily       Angiotensin-II Receptors Failed - 4/21/2023  8:51 AM        Failed - Last blood pressure under 140/90 in past 12 months     BP Readings from Last 3 Encounters:   03/01/23 (!) 162/103   08/01/22 130/85   01/28/22 130/88                 Passed - Recent (12 mo) or future (30 days) visit within the authorizing provider's specialty     Patient has had an office visit with the authorizing provider or a provider within the authorizing providers department within the previous 12 mos or has a future within next 30 days. See \"Patient Info\" tab in inbasket, or \"Choose Columns\" in Meds & Orders section of the refill encounter.              Passed - Medication is active on med list        Passed - Patient is age 18 or older        Passed - Normal serum creatinine on file in past 12 months     Recent Labs   Lab Test 03/01/23  1730   CR 0.93       Ok to refill medication if creatinine is low          Passed - Normal serum potassium on file in past 12 months     Recent Labs   Lab Test 03/01/23  1730   POTASSIUM 4.4                         DELMY KNIGHT RN 04/21/23 8:53 AM  "

## 2023-04-24 RX ORDER — LOSARTAN POTASSIUM 50 MG/1
50 TABLET ORAL DAILY
Qty: 30 TABLET | Refills: 0 | Status: SHIPPED | OUTPATIENT
Start: 2023-04-24 | End: 2023-06-01

## 2023-05-31 DIAGNOSIS — I10 BENIGN ESSENTIAL HYPERTENSION: ICD-10-CM

## 2023-05-31 NOTE — TELEPHONE ENCOUNTER
Medication Question or Refill    Contacts       Type Contact Phone/Fax    05/31/2023 08:37 AM CDT Phone (Incoming) Adam Arce (Self)           What medication are you calling about (include dose and sig)?: losartan    [facility]erred [facility]:    [id number] [place] [facility] [place] [address] [email or web address] [place] [address] [email or web address] [address]  Phone: [phone] Fax: [phone]      Controlled Substance Agreement on file:   CSA -- Patient Level:    CSA: None found at the patient level.       Who prescribed the medication? Dr. You    Do you need a refill? Yes    When did you use the medication last? 05/30/2023    Patient offered an appointment? Yes: BP check scheduled for 06/07/2023    Do you have any questions or concerns?  Yes: Per patient he received phone calls to schedule BP check and he apologies he just put it off and today he realized that he needs refill of BP medication.       Could we send this information to you in GigaPan or would you prefer to receive a phone call?:   Patient would like to be contacted via GigaPan

## 2023-06-01 NOTE — TELEPHONE ENCOUNTER
"Routing refill request to provider for review/approval because:  BP    Last Written Prescription Date:  4/24/23  Last Fill Quantity: 30,  # refills: 0   Last office visit provider:  3/1/23     Requested Prescriptions   Pending Prescriptions Disp Refills     losartan (COZAAR) 50 MG tablet 30 tablet 0     Sig: Take 1 tablet (50 mg) by mouth daily       Angiotensin-II Receptors Failed - 6/1/2023  2:43 PM        Failed - Last blood pressure under 140/90 in past 12 months     BP Readings from Last 3 Encounters:   03/01/23 (!) 162/103   08/01/22 130/85   01/28/22 130/88                 Passed - Recent (12 mo) or future (30 days) visit within the authorizing provider's specialty     Patient has had an office visit with the authorizing provider or a provider within the authorizing providers department within the previous 12 mos or has a future within next 30 days. See \"Patient Info\" tab in inbasket, or \"Choose Columns\" in Meds & Orders section of the refill encounter.              Passed - Medication is active on med list        Passed - Patient is age 18 or older        Passed - Normal serum creatinine on file in past 12 months     Recent Labs   Lab Test 03/01/23  1730   CR 0.93       Ok to refill medication if creatinine is low          Passed - Normal serum potassium on file in past 12 months     Recent Labs   Lab Test 03/01/23  1730   POTASSIUM 4.4                         Wendi Méndez RN 06/01/23 2:44 PM  "

## 2023-06-02 RX ORDER — LOSARTAN POTASSIUM 50 MG/1
50 TABLET ORAL DAILY
Qty: 30 TABLET | Refills: 0 | Status: SHIPPED | OUTPATIENT
Start: 2023-06-02 | End: 2023-06-07

## 2023-06-07 ENCOUNTER — ALLIED HEALTH/NURSE VISIT (OUTPATIENT)
Dept: FAMILY MEDICINE | Facility: CLINIC | Age: 43
End: 2023-06-07
Payer: COMMERCIAL

## 2023-06-07 VITALS — DIASTOLIC BLOOD PRESSURE: 78 MMHG | SYSTOLIC BLOOD PRESSURE: 138 MMHG

## 2023-06-07 DIAGNOSIS — I10 BENIGN ESSENTIAL HYPERTENSION: ICD-10-CM

## 2023-06-07 PROCEDURE — 99207 PR NO CHARGE NURSE ONLY: CPT

## 2023-06-07 RX ORDER — LOSARTAN POTASSIUM 50 MG/1
50 TABLET ORAL DAILY
Qty: 90 TABLET | Refills: 0 | Status: SHIPPED | OUTPATIENT
Start: 2023-06-07 | End: 2023-09-06

## 2023-06-07 NOTE — PROGRESS NOTES
I met with Adam Arce at the request of  to recheck his blood pressure.  Blood pressure medications on the med list were reviewed with patient.    Patient has taken all medications as per usual regimen: Yes  Patient reports tolerating them without any issues or concerns: Yes    Vitals:    06/07/23 1552 06/07/23 1603   BP: (!) 144/92 138/78       After 5 minutes, the patient's blood pressure was <140/90, the previous encounter was reviewed, recorded blood pressure below 140/90. Patient was discharged and the note will be sent to the provider for final review.    Pt wanted to give PCP an update that he no longer has the chest pain he had in March during his last visit. He has been without his Losartan for 3-4 days. Requested a 90 day supply sent in as insurance would not cover the 30 day bridge. Pt is scheduled for a follow up visit with PCP 6/26.

## 2023-06-26 DIAGNOSIS — E11.3299 TYPE 2 DIABETES MELLITUS WITH MILD NONPROLIFERATIVE RETINOPATHY WITHOUT MACULAR EDEMA, WITHOUT LONG-TERM CURRENT USE OF INSULIN, UNSPECIFIED LATERALITY (H): ICD-10-CM

## 2023-06-26 NOTE — TELEPHONE ENCOUNTER
Medication Question or Refill    Contacts       Type Contact Phone/Fax    06/26/2023 01:14 PM CDT Phone (Incoming) Adam Arce (Self)           What medication are you calling about (include dose and sig)?: Trulicity    [facility]acy:    [id number] [place] [facility] [place] [address] [address] [place] [address] [facility] [address]  Phone: [phone] Fax: [phone]      Controlled Substance Agreement on file:   CSA -- Patient Level:    CSA: None found at the patient level.       Who prescribed the medication?: PCP Dr. You    Do you need a refill? Yes    When did you use the medication last? Per patient he has been out    Patient offered an appointment? Yes: patient cancelled today's appt 06/26/23 with PCP declined to reschedule states he needs to look at his calendar and will reschedule.     Do you have any questions or concerns?  No      Could we send this information to you in OB10 or would you prefer to receive a phone call?:   Patient would like to be contacted via OB10

## 2023-06-27 RX ORDER — DULAGLUTIDE 1.5 MG/.5ML
1.5 INJECTION, SOLUTION SUBCUTANEOUS
Qty: 6 ML | Refills: 0 | Status: SHIPPED | OUTPATIENT
Start: 2023-06-27

## 2023-06-27 NOTE — TELEPHONE ENCOUNTER
"Routing refill request to provider for review/approval because:  Drug not active on patient's medication list  Statin listed as historical, unsure for reasoning on pt's list    Last Written Prescription Date:  3/3/23  Last Fill Quantity: 6mL,  # refills: 0   Last office visit provider:  3/1/23     Requested Prescriptions   Pending Prescriptions Disp Refills     dulaglutide (TRULICITY) 1.5 MG/0.5ML pen 6 mL 0     Sig: Inject 1.5 mg Subcutaneous every 7 days       GLP-1 Agonists Protocol Failed - 6/27/2023 12:46 AM        Failed - Medication is active on med list        Passed - HgbA1C in past 3 or 6 months     If HgbA1C is 8 or greater, it needs to be on file within the past 3 months.  If less than 8, must be on file within the past 6 months.     Recent Labs   Lab Test 03/01/23  1730   A1C 8.0*             Passed - Patient is age 18 or older        Passed - Normal serum creatinine on file in past 12 months     Recent Labs   Lab Test 03/01/23  1730   CR 0.93       Ok to refill medication if creatinine is low          Passed - Recent (6 mo) or future (30 days) visit within the authorizing provider's specialty     Patient had office visit in the last 6 months or has a visit in the next 30 days with authorizing provider.  See \"Patient Info\" tab in inbasket, or \"Choose Columns\" in Meds & Orders section of the refill encounter.               STATIN NOT PRESCRIBED (INTENTIONAL)       Sig: Please choose reason not prescribed from choices below.       Statins Protocol Failed - 6/26/2023  2:04 PM        Failed - Medication is active on med list        Passed - LDL on file in past 12 months     Recent Labs   Lab Test 03/01/23  1730   *             Passed - No abnormal creatine kinase in past 12 months     No lab results found.             Passed - Recent (12 mo) or future (30 days) visit within the authorizing provider's specialty     Patient has had an office visit with the authorizing provider or a provider within the " "authorizing providers department within the previous 12 mos or has a future within next 30 days. See \"Patient Info\" tab in inbasket, or \"Choose Columns\" in Meds & Orders section of the refill encounter.              Passed - Patient is age 18 or older             Bela Boudreaux RN 06/27/23 12:47 AM  "

## 2023-07-18 ENCOUNTER — TELEPHONE (OUTPATIENT)
Dept: FAMILY MEDICINE | Facility: CLINIC | Age: 43
End: 2023-07-18
Payer: COMMERCIAL

## 2023-07-18 DIAGNOSIS — E11.3299 TYPE 2 DIABETES MELLITUS WITH MILD NONPROLIFERATIVE RETINOPATHY WITHOUT MACULAR EDEMA, WITHOUT LONG-TERM CURRENT USE OF INSULIN, UNSPECIFIED LATERALITY (H): Primary | ICD-10-CM

## 2023-07-18 NOTE — TELEPHONE ENCOUNTER
Please call the pharmacy and inquire if Bydureon with the same signature as trulicity is available.

## 2023-07-18 NOTE — TELEPHONE ENCOUNTER
Spoke with pharmacy regarding bydureon is not covered. Trulicity is indeed on back order and they can not get it in.    Pharmacy staff stated Novolog and Humalog are covered and in stock for alternatives

## 2023-07-19 RX ORDER — ORAL SEMAGLUTIDE 3 MG/1
3 TABLET ORAL DAILY
Qty: 30 TABLET | Refills: 0 | Status: SHIPPED | OUTPATIENT
Start: 2023-07-19 | End: 2023-08-29

## 2023-07-19 NOTE — TELEPHONE ENCOUNTER
Patient calling back in regards to VM below.  Message from provider relayed.  Patient expressed understanding and he is open to alternatives at this time until the Trulicity is back in stock.  Please send alternative to pharmacy on file.  Any additional questions, patient can be reached at [phone].

## 2023-07-19 NOTE — TELEPHONE ENCOUNTER
Left message for patient to return call to clinic.  Message included his pharmacy contacted clinic to report no stock of medication for patient, alternatives suggested if he is interested to joseph back to clinic.

## 2023-07-19 NOTE — TELEPHONE ENCOUNTER
Please call patient to inform of the backorder. However, and in the meantime, he can try out semaglutide which is oral form of the similar medication.  If agreed, will send to the preferred pharmacy as an alternative.

## 2023-07-25 ENCOUNTER — TELEPHONE (OUTPATIENT)
Dept: FAMILY MEDICINE | Facility: CLINIC | Age: 43
End: 2023-07-25
Payer: COMMERCIAL

## 2023-07-28 NOTE — TELEPHONE ENCOUNTER
Central Prior Authorization Team   Phone: [phone]    PA Initiation    Medication: RYBELSUS 3 MG PO TABS  Insurance Company: [employer] - Phone [phone] Fax [phone]  [facility] [id number] [place] [address] [address]  Filling Pharmacy Phone: [phone]  Filling Pharmacy Fax:    Start Date: 7/28/2023    Started PA on CMM and a response of This request has been approved using information available on the patient's profile. CaseId:[id number].

## 2023-07-28 NOTE — TELEPHONE ENCOUNTER
Prior Authorization Approval    Medication: RYBELSUS 3 MG PO TABS  Authorization Effective Date: 6/28/2023  Authorization Expiration Date: 7/27/2024  Approved Dose/Quantity:   Reference #:     Insurance Company: [employer] - Phone [phone] Fax [phone]  Expected CoPay:       CoPay Card Available:      Financial Assistance Needed:   [id number] [place] [address]  Pharmacy Notified: Yes  Patient Notified: No  **Instructed pharmacy to notify patient when script is ready to /ship.**

## 2023-08-29 DIAGNOSIS — E11.3299 TYPE 2 DIABETES MELLITUS WITH MILD NONPROLIFERATIVE RETINOPATHY WITHOUT MACULAR EDEMA, WITHOUT LONG-TERM CURRENT USE OF INSULIN, UNSPECIFIED LATERALITY (H): ICD-10-CM

## 2023-08-30 RX ORDER — ORAL SEMAGLUTIDE 3 MG/1
3 TABLET ORAL DAILY
Qty: 90 TABLET | Refills: 0 | Status: SHIPPED | OUTPATIENT
Start: 2023-08-30

## 2023-08-30 NOTE — TELEPHONE ENCOUNTER
"Routing refill request to provider for review/approval because:  Labs out of range:  A1c 8.0  Labs not current:  3/1/2022  Patient needs to be seen because:  diabetic needs to be seen every 6 months  Last Written Prescription Date:  7/19/2023  Last Fill Quantity: 30,  # refills: 0   Last office visit provider:  3/1/2023     Requested Prescriptions   Pending Prescriptions Disp Refills    Semaglutide (RYBELSUS) 3 MG tablet 30 tablet 0     Sig: Take 3 mg by mouth daily       GLP-1 Agonists Protocol Failed - 8/30/2023 11:44 AM        Failed - HgbA1C in past 3 or 6 months     If HgbA1C is 8 or greater, it needs to be on file within the past 3 months.  If less than 8, must be on file within the past 6 months.     Recent Labs   Lab Test 03/01/23  1730   A1C 8.0*             Failed - Recent (6 mo) or future (30 days) visit within the authorizing provider's specialty     Patient had office visit in the last 6 months or has a visit in the next 30 days with authorizing provider.  See \"Patient Info\" tab in inbasket, or \"Choose Columns\" in Meds & Orders section of the refill encounter.            Passed - Medication is active on med list        Passed - Patient is age 18 or older        Passed - Normal serum creatinine on file in past 12 months     Recent Labs   Lab Test 03/01/23  1730   CR 0.93       Ok to refill medication if creatinine is low               Anita Richardson RN 08/30/23 11:46 AM  "

## 2023-09-06 DIAGNOSIS — I10 BENIGN ESSENTIAL HYPERTENSION: ICD-10-CM

## 2023-09-06 RX ORDER — LOSARTAN POTASSIUM 50 MG/1
50 TABLET ORAL DAILY
Qty: 90 TABLET | Refills: 1 | Status: SHIPPED | OUTPATIENT
Start: 2023-09-06

## 2023-09-06 NOTE — TELEPHONE ENCOUNTER
"Last Written Prescription Date:  6/7/23  Last Fill Quantity: 90,  # refills: 0   Last office visit provider:  3/1/23     Requested Prescriptions   Pending Prescriptions Disp Refills    losartan (COZAAR) 50 MG tablet 90 tablet 0     Sig: Take 1 tablet (50 mg) by mouth daily       Angiotensin-II Receptors Passed - 9/6/2023  2:47 PM        Passed - Last blood pressure under 140/90 in past 12 months     BP Readings from Last 3 Encounters:   06/07/23 138/78   03/01/23 (!) 162/103   08/01/22 130/85                 Passed - Recent (12 mo) or future (30 days) visit within the authorizing provider's specialty     Patient has had an office visit with the authorizing provider or a provider within the authorizing providers department within the previous 12 mos or has a future within next 30 days. See \"Patient Info\" tab in inbasket, or \"Choose Columns\" in Meds & Orders section of the refill encounter.              Passed - Medication is active on med list        Passed - Patient is age 18 or older        Passed - Normal serum creatinine on file in past 12 months     Recent Labs   Lab Test 03/01/23  1730   CR 0.93       Ok to refill medication if creatinine is low          Passed - Normal serum potassium on file in past 12 months     Recent Labs   Lab Test 03/01/23  1730   POTASSIUM 4.4                         Amalia Hurtado 09/06/23 5:47 PM  "

## 2023-09-06 NOTE — TELEPHONE ENCOUNTER
Refill request for the attached medication     Prashant Gilmore Jr., CMA on 9/6/2023 at 2:46 PM

## 2023-09-29 DIAGNOSIS — E11.3299 TYPE 2 DIABETES MELLITUS WITH MILD NONPROLIFERATIVE RETINOPATHY WITHOUT MACULAR EDEMA, WITHOUT LONG-TERM CURRENT USE OF INSULIN, UNSPECIFIED LATERALITY (H): ICD-10-CM

## 2023-09-30 ENCOUNTER — HEALTH MAINTENANCE LETTER (OUTPATIENT)
Age: 43
End: 2023-09-30

## 2024-01-02 ENCOUNTER — MYC REFILL (OUTPATIENT)
Dept: FAMILY MEDICINE | Facility: CLINIC | Age: 44
End: 2024-01-02
Payer: COMMERCIAL

## 2024-01-02 DIAGNOSIS — E11.3299 TYPE 2 DIABETES MELLITUS WITH MILD NONPROLIFERATIVE RETINOPATHY WITHOUT MACULAR EDEMA, WITHOUT LONG-TERM CURRENT USE OF INSULIN, UNSPECIFIED LATERALITY (H): ICD-10-CM

## 2024-04-27 ENCOUNTER — HEALTH MAINTENANCE LETTER (OUTPATIENT)
Age: 44
End: 2024-04-27

## 2024-11-23 ENCOUNTER — HEALTH MAINTENANCE LETTER (OUTPATIENT)
Age: 44
End: 2024-11-23

## 2025-06-01 ENCOUNTER — HEALTH MAINTENANCE LETTER (OUTPATIENT)
Age: 45
End: 2025-06-01

## 2025-08-21 ENCOUNTER — APPOINTMENT (OUTPATIENT)
Dept: CT IMAGING | Facility: CLINIC | Age: 45
End: 2025-08-21
Attending: EMERGENCY MEDICINE
Payer: COMMERCIAL

## 2025-08-21 ENCOUNTER — HOSPITAL ENCOUNTER (EMERGENCY)
Facility: CLINIC | Age: 45
Discharge: HOME | End: 2025-08-21
Attending: EMERGENCY MEDICINE
Payer: COMMERCIAL

## 2025-08-21 VITALS
RESPIRATION RATE: 20 BRPM | OXYGEN SATURATION: 98 % | HEART RATE: 83 BPM | TEMPERATURE: 96.4 F | BODY MASS INDEX: 33.15 KG/M2 | SYSTOLIC BLOOD PRESSURE: 140 MMHG | DIASTOLIC BLOOD PRESSURE: 79 MMHG | WEIGHT: 258.3 LBS | HEIGHT: 74 IN

## 2025-08-21 DIAGNOSIS — N23 RENAL COLIC ON LEFT SIDE: ICD-10-CM

## 2025-08-21 DIAGNOSIS — N20.0 KIDNEY STONE: Primary | ICD-10-CM

## 2025-08-21 LAB
ALBUMIN UR-MCNC: NEGATIVE MG/DL
ANION GAP SERPL CALCULATED.3IONS-SCNC: 18 MMOL/L (ref 7–15)
APPEARANCE UR: CLEAR
BASOPHILS # BLD AUTO: 0.03 10E3/UL (ref 0–0.2)
BASOPHILS NFR BLD AUTO: 0.2 %
BILIRUB UR QL STRIP: NEGATIVE
BUN SERPL-MCNC: 15.1 MG/DL (ref 6–20)
CALCIUM SERPL-MCNC: 9.8 MG/DL (ref 8.8–10.4)
CHLORIDE SERPL-SCNC: 99 MMOL/L (ref 98–107)
COLOR UR AUTO: YELLOW
CREAT SERPL-MCNC: 1.23 MG/DL (ref 0.67–1.17)
EGFRCR SERPLBLD CKD-EPI 2021: 75 ML/MIN/1.73M2
EOSINOPHIL # BLD AUTO: 0.03 10E3/UL (ref 0–0.7)
EOSINOPHIL NFR BLD AUTO: 0.2 %
ERYTHROCYTE [DISTWIDTH] IN BLOOD BY AUTOMATED COUNT: 13.5 % (ref 10–15)
GLUCOSE SERPL-MCNC: 234 MG/DL (ref 70–99)
GLUCOSE UR STRIP-MCNC: 150 MG/DL
HCO3 SERPL-SCNC: 22 MMOL/L (ref 22–29)
HCT VFR BLD AUTO: 40.3 % (ref 40–53)
HGB BLD-MCNC: 13.9 G/DL (ref 13.3–17.7)
HGB UR QL STRIP: ABNORMAL
IMM GRANULOCYTES # BLD: 0.05 10E3/UL
IMM GRANULOCYTES NFR BLD: 0.4 %
KETONES UR STRIP-MCNC: ABNORMAL MG/DL
LEUKOCYTE ESTERASE UR QL STRIP: NEGATIVE
LYMPHOCYTES # BLD AUTO: 1.51 10E3/UL (ref 0.8–5.3)
LYMPHOCYTES NFR BLD AUTO: 12.2 %
MCH RBC QN AUTO: 28 PG (ref 26.5–33)
MCHC RBC AUTO-ENTMCNC: 34.5 G/DL (ref 31.5–36.5)
MCV RBC AUTO: 81.3 FL (ref 78–100)
MONOCYTES # BLD AUTO: 0.77 10E3/UL (ref 0–1.3)
MONOCYTES NFR BLD AUTO: 6.2 %
MUCOUS THREADS #/AREA URNS LPF: PRESENT /LPF
NEUTROPHILS # BLD AUTO: 10.01 10E3/UL (ref 1.6–8.3)
NEUTROPHILS NFR BLD AUTO: 80.8 %
NITRATE UR QL: NEGATIVE
NRBC # BLD AUTO: <0.03 10E3/UL
NRBC BLD AUTO-RTO: 0 /100
PH UR STRIP: 5.5 [PH] (ref 5–7)
PLATELET # BLD AUTO: 223 10E3/UL (ref 150–450)
POTASSIUM SERPL-SCNC: 4 MMOL/L (ref 3.4–5.3)
RBC # BLD AUTO: 4.96 10E6/UL (ref 4.4–5.9)
RBC URINE: 7 /HPF
SODIUM SERPL-SCNC: 139 MMOL/L (ref 135–145)
SP GR UR STRIP: 1.03 (ref 1–1.03)
UROBILINOGEN UR STRIP-MCNC: NORMAL MG/DL
WBC # BLD AUTO: 12.4 10E3/UL (ref 4–11)
WBC URINE: 1 /HPF

## 2025-08-21 PROCEDURE — 99285 EMERGENCY DEPT VISIT HI MDM: CPT | Mod: 25

## 2025-08-21 PROCEDURE — 250N000011 HC RX IP 250 OP 636: Performed by: EMERGENCY MEDICINE

## 2025-08-21 PROCEDURE — 81001 URINALYSIS AUTO W/SCOPE: CPT | Performed by: EMERGENCY MEDICINE

## 2025-08-21 PROCEDURE — 96374 THER/PROPH/DIAG INJ IV PUSH: CPT

## 2025-08-21 PROCEDURE — 99285 EMERGENCY DEPT VISIT HI MDM: CPT | Mod: 25 | Performed by: EMERGENCY MEDICINE

## 2025-08-21 PROCEDURE — 74176 CT ABD & PELVIS W/O CONTRAST: CPT

## 2025-08-21 PROCEDURE — 80048 BASIC METABOLIC PNL TOTAL CA: CPT | Performed by: FAMILY MEDICINE

## 2025-08-21 PROCEDURE — 96375 TX/PRO/DX INJ NEW DRUG ADDON: CPT

## 2025-08-21 PROCEDURE — 250N000013 HC RX MED GY IP 250 OP 250 PS 637: Performed by: EMERGENCY MEDICINE

## 2025-08-21 PROCEDURE — 85025 COMPLETE CBC W/AUTO DIFF WBC: CPT | Performed by: FAMILY MEDICINE

## 2025-08-21 PROCEDURE — 36415 COLL VENOUS BLD VENIPUNCTURE: CPT | Performed by: FAMILY MEDICINE

## 2025-08-21 RX ORDER — ACETAMINOPHEN 325 MG/1
975 TABLET ORAL ONCE
Status: COMPLETED | OUTPATIENT
Start: 2025-08-21 | End: 2025-08-21

## 2025-08-21 RX ORDER — ONDANSETRON 4 MG/1
4 TABLET, ORALLY DISINTEGRATING ORAL EVERY 8 HOURS PRN
Qty: 12 TABLET | Refills: 0 | Status: SHIPPED | OUTPATIENT
Start: 2025-08-21 | End: 2025-08-24

## 2025-08-21 RX ORDER — KETOROLAC TROMETHAMINE 15 MG/ML
15 INJECTION, SOLUTION INTRAMUSCULAR; INTRAVENOUS ONCE
Status: COMPLETED | OUTPATIENT
Start: 2025-08-21 | End: 2025-08-21

## 2025-08-21 RX ORDER — OXYCODONE HYDROCHLORIDE 5 MG/1
5 TABLET ORAL EVERY 4 HOURS PRN
Qty: 12 TABLET | Refills: 0 | Status: SHIPPED | OUTPATIENT
Start: 2025-08-21 | End: 2025-08-25

## 2025-08-21 RX ORDER — IBUPROFEN 200 MG
400 TABLET ORAL EVERY 6 HOURS
Qty: 56 TABLET | Refills: 0 | Status: SHIPPED | OUTPATIENT
Start: 2025-08-21 | End: 2025-08-28

## 2025-08-21 RX ORDER — ACETAMINOPHEN 500 MG
1000 TABLET ORAL EVERY 6 HOURS
Qty: 56 TABLET | Refills: 0 | Status: SHIPPED | OUTPATIENT
Start: 2025-08-21 | End: 2025-08-28

## 2025-08-21 RX ORDER — DIMENHYDRINATE 50 MG
50 TABLET ORAL AT BEDTIME
Qty: 7 TABLET | Refills: 0 | Status: SHIPPED | OUTPATIENT
Start: 2025-08-21 | End: 2025-08-28

## 2025-08-21 RX ORDER — DIMENHYDRINATE 50 MG
50 TABLET ORAL EVERY 6 HOURS PRN
Qty: 28 TABLET | Refills: 0 | Status: SHIPPED | OUTPATIENT
Start: 2025-08-21 | End: 2025-08-28

## 2025-08-21 RX ORDER — ONDANSETRON 2 MG/ML
4 INJECTION INTRAMUSCULAR; INTRAVENOUS ONCE
Status: COMPLETED | OUTPATIENT
Start: 2025-08-21 | End: 2025-08-21

## 2025-08-21 RX ADMIN — Medication 50 MG: at 21:30

## 2025-08-21 RX ADMIN — ONDANSETRON 4 MG: 2 INJECTION, SOLUTION INTRAMUSCULAR; INTRAVENOUS at 21:30

## 2025-08-21 RX ADMIN — ACETAMINOPHEN 975 MG: 325 TABLET ORAL at 21:28

## 2025-08-21 RX ADMIN — KETOROLAC TROMETHAMINE 15 MG: 15 INJECTION, SOLUTION INTRAMUSCULAR; INTRAVENOUS at 21:30

## 2025-08-21 ASSESSMENT — ACTIVITIES OF DAILY LIVING (ADL): ADLS_ACUITY_SCORE: 41

## 2025-08-21 ASSESSMENT — COLUMBIA-SUICIDE SEVERITY RATING SCALE - C-SSRS
2. HAVE YOU ACTUALLY HAD ANY THOUGHTS OF KILLING YOURSELF IN THE PAST MONTH?: NO
6. HAVE YOU EVER DONE ANYTHING, STARTED TO DO ANYTHING, OR PREPARED TO DO ANYTHING TO END YOUR LIFE?: NO
1. IN THE PAST MONTH, HAVE YOU WISHED YOU WERE DEAD OR WISHED YOU COULD GO TO SLEEP AND NOT WAKE UP?: NO

## 2025-09-02 ENCOUNTER — VIRTUAL VISIT (OUTPATIENT)
Dept: UROLOGY | Facility: CLINIC | Age: 45
End: 2025-09-02
Attending: EMERGENCY MEDICINE
Payer: COMMERCIAL

## 2025-09-02 DIAGNOSIS — N20.0 NEPHROLITHIASIS: ICD-10-CM

## 2025-09-02 DIAGNOSIS — N20.1 LEFT URETERAL CALCULUS: Primary | ICD-10-CM

## 2025-09-02 PROCEDURE — 98001 SYNCH AUDIO-VIDEO NEW LOW 30: CPT | Performed by: NURSE PRACTITIONER

## 2025-09-02 PROCEDURE — 1126F AMNT PAIN NOTED NONE PRSNT: CPT | Mod: 95 | Performed by: NURSE PRACTITIONER
